# Patient Record
Sex: MALE | Race: WHITE | Employment: FULL TIME | URBAN - METROPOLITAN AREA
[De-identification: names, ages, dates, MRNs, and addresses within clinical notes are randomized per-mention and may not be internally consistent; named-entity substitution may affect disease eponyms.]

---

## 2018-06-13 ENCOUNTER — HOSPITAL ENCOUNTER (OUTPATIENT)
Dept: PHYSICAL THERAPY | Age: 28
Discharge: HOME | End: 2018-06-13
Payer: COMMERCIAL

## 2018-06-13 PROCEDURE — 97140 MANUAL THERAPY 1/> REGIONS: CPT

## 2018-06-13 PROCEDURE — 97161 PT EVAL LOW COMPLEX 20 MIN: CPT

## 2018-06-13 PROCEDURE — 97110 THERAPEUTIC EXERCISES: CPT

## 2018-06-13 NOTE — PROGRESS NOTES
Ambulatory/Rehab Services H2 Model Falls Risk Assessment    Risk Factor Pts. ·   Confusion/Disorientation/Impulsivity  []    4 ·   Symptomatic Depression  []   2 ·   Altered Elimination  []   1 ·   Dizziness/Vertigo  []   1 ·   Gender (Male)  []   1 ·   Any administered antiepileptics (anticonvulsants):  []   2 ·   Any administered benzodiazepines:  []   1 ·   Visual Impairment (specify):  []   1 ·   Portable Oxygen Use  []   1 ·   Orthostatic ? BP  []   1 ·   History of Recent Falls (within 3 mos.)  []   5     Ability to Rise from Chair (choose one) Pts. ·   Ability to rise in a single movement  []   0 ·   Pushes up, successful in one attempt  [x]   1 ·   Multiple attempts, but successful  []   3 ·   Unable to rise without assistance  []   4   Total: (5 or greater = High Risk) 1     Falls Prevention Plan:   []                Physical Limitations to Exercise (specify):   []                Mobility Assistance Device (type):   []                Exercise/Equipment Adaptation (specify):    ©2010 [facility] [address] Patent #[id number].  Federal Law prohibits the replication, distribution or use without written permission from [facility] [employer]

## 2018-06-13 NOTE — THERAPY EVALUATION
Andree Patel  : 1990  Primary: Micky Mortensen [facility]  Secondary:  [address] at [address], [place], [address] [place] [address]  [phone]   [phone]         OUTPATIENT PHYSICAL THERAPY:Initial Assessment 2018    ICD-10: Treatment Diagnosis: laceration without foreign body of other part of head, initial encounter (S01.81XA)  Treatment Diagnosis 2: Open bite of other part of head, initial encounter (S01.85XA)  Treatment Diagnosis 3: headache (R51)  Precautions: None  Allergies:   Review of patient's allergies indicates no known allergies. Fall Risk Score: 1 (? 5 = High Risk)  MD Orders: Evaluate and Treat MEDICAL/REFERRING DIAGNOSIS:  Laceration without foreign body of other part of head, initial encounter [place]  Open bite of other part of head, initial encounter [S01.85XA]   DATE OF ONSET: 17  REFERRING PHYSICIAN: Thor Newton MD  RETURN PHYSICIAN APPOINTMENT: not scheduled     INITIAL ASSESSMENT:  Mr. Maty Preciado is a 32 y.o. male presenting with scalp and head tenderness, pain, numbness, and associated nausea due to scar tissue from a dog bite he incurred 17. 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 The dog reset the original bite so he incurred 2 bites from the dog. He reported significant tearing of the skin of his scalp that required surgery and 60 staples. He currently reports significant tenderness, hypersensitivity, pain, and numbness of the areas involving the bite. He is eager to improve his symptoms and reduce overall discomfort. He is a good candidate for skilled intervention with services to include manual therapy, modalities as needed, therapeutic exercises, postural re-education, and scar desensitization/mobilization.      PROBLEM LIST (Impacting functional limitations):  1. Increased Pain  2. Hypersensitivity, numbness, and nausea INTERVENTIONS PLANNED:  1. Cold  2. Cryotherapy  3. Heat  4. Home Exercise Program (HEP)  5. Manual Therapy  6. Range of Motion (ROM)  7. Therapeutic Exercise/Strengthening  8. scar desensitization/mobilization   TREATMENT PLAN:  Effective Dates: 6/13/2018 TO 7/25/2018. Frequency/Duration: 2 times a week for 4-6 weeks  GOALS: (Goals have been discussed and agreed upon with patient.)  Short-Term Functional Goals: Time Frame: 6/13/2018 to 7/4/2018  1. Patient demonstrates independence with home exercise program without verbal cueing provided by therapist.  2. Improve patient's tolerance to touching his head and performing wash rag desensitization for up to 5 minutes at least 1 time a day. 3. Improve flexibility of bilateral upper trapezius and levator scapulae bilaterally in order to reduce suboccipital and cervical tightness. Discharge Goals: Time Frame: 6/13/2018 to 7/25/2018  1. Improve pain 2/10 at the most with wearing a hat, touching his head, and washing his hair. 2. Improve tolerance to scar mobilization in the clinic and with HEP for up to 10 minutes with minimal to no sensation of nausea and minimal pain. 3. Improve tenderness to palpation and spasm of bilateral suboccipitals and proximal cervical paraspinals in order to reduce head pain. 4. Reduce ear symptoms of fullness/opening and pain with incidence less than 1 time a day. 5. Maintain Neck Disability Index score at 0/50 throughout rehab process. Rehabilitation Potential For Stated Goals: Good  Regarding Megan Carina therapy, I certify that the treatment plan above will be carried out by a therapist or under their direction.   Thank you for this referral,  Lupe Tripathi, PT, DPT, OCS     Referring Physician Signature: Prashant Beckett MD _________________________________ Date: ________            The information in this section was collected on 6/13/2018 (except where otherwise noted). HISTORY:   History of Present Injury/Illness (Reason for Referral):  Mr. Tanika Betts is a 32 y.o. male presenting with scalp and head tenderness, pain, numbness, and associated nausea due to scar tissue from a dog bite he incurred 6/27/17. He reported he was training a [address] as a part of his job as a [employer]  and the dog accidentally bit him on the top of the head. The dog reset the original bite so he incurred 2 bites from the dog. He reported significant tearing of the skin of his scalp that required surgery and 60 staples. He currently reports significant tenderness, hypersensitivity, pain, and numbness of the areas involving the bite. He is eager to improve his symptoms and reduce overall discomfort. Past Medical History/Comorbidities:   Mr. Tanika Betts  has no past medical history on file. Mr. Tanika Betts  has no past surgical history on file. Social History/Living Environment:    Patient lives at home with wife and reports no complaints with household chores/ADLs. Prior Level of Function/Work/Activity:  Independent without dysfunction. 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 He is very active and exercise at the gym lifting weights 3 times a week. Dominant Side:         RIGHT  Current Medications:       Current Outpatient Prescriptions:    fish oil and daily multivitamin   Date Last Reviewed:  6/13/2018   Number of Personal Factors/Comorbidities that affect the Plan of Care: 0: LOW COMPLEXITY   EXAMINATION:     Patient denies any increase of symptoms with coughing, sneezing or valsalva maneuver. Patient denies any changes in vision, dizziness, vertigo, drop attacks, black outs, tinnitus, dysphagia, dysarthria, LE symptoms or bowel/bladder dysfunction.     Observation/Orthostatic Postural Assessment: Patient sits with moderate forward head, forward shoulders, and thoracic kyphosis that is reversible with cuing. Superior portion of scalp reveals irregular scar across top of head that is completely approximated with scar tissue. No significant deformity of the skull/scalp is noted. Palpation: Gross tenderness to palpation and hypersensitivity of the scalp and scar tissue superiorly. Significant restrictions are noted in all directions to entire scar but mostly to the right portions of the scalp with palpable divot noted. Flexibility mildly limited of upper trapezius and levator scapulae bilaterally. Suboccipitals limited bilaterally. Vertebral-Basilar Screen: Hautant's test is negative. Cranial extension test is negative. ROM:   Cervical extension (degrees): 70°   Cervical flexion: 60°   Cervical left side bend: 50°   Cervical right side bend: 40°   Cervical left rotation: 80°   Cervical right rotation: 70°     Strength: Grossly 5/5 for upper quarter. Joint Mobility:  Mild to moderate limitations noted of suboccipitals with posterior glide of occiput on C1. Mild limitations with bilateral C1 on C2 rotations with cervical spine locked into full flexion. Special Tests: Ligament stress tests performed through upper cervical spine for transverse ligament including Sharp-Pillo test is negative, and [place]-Axis shear test is negative. [place]-Axis side flexion test for integrity of alar ligament is negative. Spurling test is mildly positive on the right. Cervical distraction is for stretch and tension release of neck. Neurovascular testing for thoracic outlet syndrome is negative. Neurological Screen:  Myotomes: Key muscle strength testing for bilateral UE is WNL. Dermatomes: Sensation testing through bilateral upper quadrants for light touch is WNL. Functional Mobility:  Patient is independent and safe with all activities. Reports biggest limitation is sensitivity to the touch of the top of the head. Body Structures Involved:  1. Joints  2. Muscles  3. Ligaments Body Functions Affected:  1. Sensory/Pain  2. Neuromusculoskeletal Activities and Participation Affected:  1. Community, Social and [place]   Number of elements (examined above) that affect the Plan of Care: 3: MODERATE COMPLEXITY   CLINICAL PRESENTATION:   Presentation: Stable and uncomplicated: LOW COMPLEXITY   CLINICAL DECISION MAKING:   Outcome Measure: Tool Used: Neck Disability Index (NDI)  Score:  Initial: 0/50  Most Recent: X/50 (Date: -- )   Interpretation of Score: The Neck Disability Index is a revised form of the Oswestry Low Back Pain Index and is designed to measure the activities of daily living in person's with neck pain. Each section is scored on a 0-5 scale, 5 representing the greatest disability. The scores of each section are added together for a total score of 50. Medical Necessity:   · Patient is expected to demonstrate progress in strength, range of motion, balance and coordination to improve tolerance to touching his head, performing self scar mobilization, washing hair, and wearing hats. · Skilled intervention continues to be required due to hypersensitivity, head pain, numbness, and nausea associated with pain. Reason for Services/Other Comments:  · Patient continues to require skilled intervention due to increasing complexity of exercises. Use of outcome tool(s) and clinical judgement create a POC that gives a: Clear prediction of patient's progress: LOW COMPLEXITY            TREATMENT:   (In addition to Assessment/Re-Assessment sessions the following treatments were rendered)    Pre-treatment Symptoms/Complaints:  Patient reported significant hypersensitivity to his scalp and associated nausea at times. Pain: Initial:   Pain Intensity 1: 4  Pain Location 1: Head  Pain Orientation 1: Proximal, Upper  Post Session:  4/10 patient was advised to take something over the counter and ice at home.      Therapeutic Exercise: (15 Minutes):  Exercises per grid below to improve mobility. Required minimal visual, verbal and manual cues to promote proper body alignment and promote proper body breathing techniques. Progressed resistance, range, repetitions and complexity of movement as indicated. (used abbreviations: BET-back education training) Date:  6/13/2018 Date:   Date:     Activity/Exercise Parameters Parameters Parameters   Upper trapezius stretch 3 x 30 sec with hands     Levator scapulae stretch 3 x 30 sec with hands                                     Instruction for self mobilization of scar and use of dry wash rag for desensitization - 5 min    Manual Therapy (    Soft Tissue Mobilization Duration  Duration: 15 Minutes): improve joint and soft tissue mobility  · Supine stretching of upper trapezius and levator scapulae on the right  · Supine traction of cervical spine and suboccipitals  · Supine deep tissue mobilization of suboccipitals on the right  · Supine deep tissue mobilization of the scalp scar tissue without cream and primary gliding of the scars in all directions  (Used abbreviations: MET - muscle energy technique; PNF - proprioceptive neuromuscular facilitation; NMR - neuromuscular re-education; a/p - anterior to posterior; p/a - posterior to anterior; NT - not tested)    Therapeutic Modalities: for edema and pain                              Cervical Spine Cold  Type: Cold/ice pack  Duration : 10 minutes  Patient Position: Supine (to top of head)                                                                HEP: As above; handouts given to patient for all exercises. Treatment/Session Assessment:    · Response to Treatment:  Patient tolerated session well but experienced significant nausea after soft tissue mobilization of the scalp. He was laid back down with ice to the top of the head and instructed to perform deep breathing for 10 minutes.   Afterwards, he reported minimal to no nausea at the end of session. Progress as tolerated with emphasis on scar tissue mobilization, densensitization, and cervical spine exercises. · Compliance with Program/Exercises: compliant most of the time. · Recommendations/Intent for next treatment session: \"Next visit will focus on advancements to more challenging activities\".     Total Treatment Duration: 70 minutes; 30 evaluation, 15 exercises, 15 manual therapy, 10 cold pack  PT Patient Time In/Time Out  Time In: 1520  Time Out: [address] [place]

## 2018-06-18 ENCOUNTER — HOSPITAL ENCOUNTER (OUTPATIENT)
Dept: PHYSICAL THERAPY | Age: 28
Discharge: HOME | End: 2018-06-18
Payer: COMMERCIAL

## 2018-06-18 PROCEDURE — 97140 MANUAL THERAPY 1/> REGIONS: CPT

## 2018-06-18 NOTE — PROGRESS NOTES
Dexter Cornejo  : 1990  Primary: [facility]  Secondary:  [address] at [address], [place], [address] [place] [address]  [phone]   [phone]         OUTPATIENT PHYSICAL THERAPY:Daily Note 2018    ICD-10: Treatment Diagnosis: laceration without foreign body of other part of head, initial encounter (S01.81XA)  Treatment Diagnosis 2: Open bite of other part of head, initial encounter (S01.85XA)  Treatment Diagnosis 3: headache (R51)  Precautions: None  Allergies:   Review of patient's allergies indicates no known allergies. Fall Risk Score: 1 (? 5 = High Risk)  MD Orders: Evaluate and Treat MEDICAL/REFERRING DIAGNOSIS:  Laceration without foreign body of other part of head, initial encounter Napoleon Rede  Open bite of other part of head, initial encounter [S01.85XA]   DATE OF ONSET: 17  REFERRING PHYSICIAN: Verito Rubio [facility] Street: not scheduled     INITIAL ASSESSMENT:  Mr. El Walters is a 32 y.o. male presenting with scalp and head tenderness, pain, numbness, and associated nausea due to scar tissue from a dog bite he incurred 17. 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 The dog reset the original bite so he incurred 2 bites from the dog. He reported significant tearing of the skin of his scalp that required surgery and 60 staples. He currently reports significant tenderness, hypersensitivity, pain, and numbness of the areas involving the bite. He is eager to improve his symptoms and reduce overall discomfort. He is a good candidate for skilled intervention with services to include manual therapy, modalities as needed, therapeutic exercises, postural re-education, and scar desensitization/mobilization. PROBLEM LIST (Impacting functional limitations):  1. Increased Pain  2.  Hypersensitivity, numbness, and nausea INTERVENTIONS PLANNED:  1. Cold  2. Cryotherapy  3. Heat  4. Home Exercise Program (HEP)  5. Manual Therapy  6. Range of Motion (ROM)  7. Therapeutic Exercise/Strengthening  8. scar desensitization/mobilization   TREATMENT PLAN:  Effective Dates: 6/13/2018 TO 7/25/2018. Frequency/Duration: 2 times a week for 4-6 weeks  GOALS: (Goals have been discussed and agreed upon with patient.)  Short-Term Functional Goals: Time Frame: 6/13/2018 to 7/4/2018  1. Patient demonstrates independence with home exercise program without verbal cueing provided by therapist.  2. Improve patient's tolerance to touching his head and performing wash rag desensitization for up to 5 minutes at least 1 time a day. 3. Improve flexibility of bilateral upper trapezius and levator scapulae bilaterally in order to reduce suboccipital and cervical tightness. Discharge Goals: Time Frame: 6/13/2018 to 7/25/2018  1. Improve pain 2/10 at the most with wearing a hat, touching his head, and washing his hair. 2. Improve tolerance to scar mobilization in the clinic and with HEP for up to 10 minutes with minimal to no sensation of nausea and minimal pain. 3. Improve tenderness to palpation and spasm of bilateral suboccipitals and proximal cervical paraspinals in order to reduce head pain. 4. Reduce ear symptoms of fullness/opening and pain with incidence less than 1 time a day. 5. Maintain Neck Disability Index score at 0/50 throughout rehab process. Rehabilitation Potential For Stated Goals: Good  Regarding Reyna Griffiths therapy, I certify that the treatment plan above will be carried out by a therapist or under their direction. Thank you for this referral,  Pedro Arreola, PT, DPT, OCS     Referring Physician Signature: Severino Desai*              Date                    The information in this section was collected on 6/13/2018 (except where otherwise noted).   HISTORY:   History of Present Injury/Illness (Reason for Referral):  Mr. Tobias Davey is a 32 y.o. male presenting with scalp and head tenderness, pain, numbness, and associated nausea due to scar tissue from a dog bite he incurred 6/27/17. 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 The dog reset the original bite so he incurred 2 bites from the dog. He reported significant tearing of the skin of his scalp that required surgery and 60 staples. He currently reports significant tenderness, hypersensitivity, pain, and numbness of the areas involving the bite. He is eager to improve his symptoms and reduce overall discomfort. Past Medical History/Comorbidities:   Mr. Tobias Davey  has no past medical history on file. Mr. Tobias Davey  has no past surgical history on file. Social History/Living Environment:    Patient lives at home with wife and reports no complaints with household chores/ADLs. Prior Level of Function/Work/Activity:  Independent without dysfunction. 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 He is very active and exercise at the gym lifting weights 3 times a week. Dominant Side:         RIGHT  Current Medications:       Current Outpatient Prescriptions:    fish oil and daily multivitamin   Date Last Reviewed:  6/18/2018   Number of Personal Factors/Comorbidities that affect the Plan of Care: 0: LOW COMPLEXITY   EXAMINATION:     Patient denies any increase of symptoms with coughing, sneezing or valsalva maneuver. Patient denies any changes in vision, dizziness, vertigo, drop attacks, black outs, tinnitus, dysphagia, dysarthria, LE symptoms or bowel/bladder dysfunction. Observation/Orthostatic Postural Assessment: Patient sits with moderate forward head, forward shoulders, and thoracic kyphosis that is reversible with cuing. Superior portion of scalp reveals irregular scar across top of head that is completely approximated with scar tissue. No significant deformity of the skull/scalp is noted. Palpation: Gross tenderness to palpation and hypersensitivity of the scalp and scar tissue superiorly. Significant restrictions are noted in all directions to entire scar but mostly to the right portions of the scalp with palpable divot noted. Flexibility mildly limited of upper trapezius and levator scapulae bilaterally. Suboccipitals limited bilaterally. Vertebral-Basilar Screen: Hautant's test is negative. Cranial extension test is negative. ROM:   Cervical extension (degrees): 70°   Cervical flexion: 60°   Cervical left side bend: 50°   Cervical right side bend: 40°   Cervical left rotation: 80°   Cervical right rotation: 70°     Strength: Grossly 5/5 for upper quarter. Joint Mobility:  Mild to moderate limitations noted of suboccipitals with posterior glide of occiput on C1. Mild limitations with bilateral C1 on C2 rotations with cervical spine locked into full flexion. Special Tests: Ligament stress tests performed through upper cervical spine for transverse ligament including Sharp-Pillo test is negative, and Cleveland-Axis shear test is negative. Cleveland-Axis side flexion test for integrity of alar ligament is negative. Spurling test is mildly positive on the right. Cervical distraction is for stretch and tension release of neck. Neurovascular testing for thoracic outlet syndrome is negative. Neurological Screen:  Myotomes: Key muscle strength testing for bilateral UE is WNL. Dermatomes: Sensation testing through bilateral upper quadrants for light touch is WNL. Functional Mobility:  Patient is independent and safe with all activities. Reports biggest limitation is sensitivity to the touch of the top of the head. Body Structures Involved:  1. Joints  2. Muscles  3. Ligaments Body Functions Affected:  1. Sensory/Pain  2. Neuromusculoskeletal Activities and Participation Affected:  1.  Community, Social and [place]   Number of elements (examined above) that affect the Plan of Care: 3: MODERATE COMPLEXITY   CLINICAL PRESENTATION:   Presentation: Stable and uncomplicated: LOW COMPLEXITY   CLINICAL DECISION MAKING:   Outcome Measure: Tool Used: Neck Disability Index (NDI)  Score:  Initial: 0/50  Most Recent: X/50 (Date: -- )   Interpretation of Score: The Neck Disability Index is a revised form of the Oswestry Low Back Pain Index and is designed to measure the activities of daily living in person's with neck pain. Each section is scored on a 0-5 scale, 5 representing the greatest disability. The scores of each section are added together for a total score of 50. Medical Necessity:   · Patient is expected to demonstrate progress in strength, range of motion, balance and coordination to improve tolerance to touching his head, performing self scar mobilization, washing hair, and wearing hats. · Skilled intervention continues to be required due to hypersensitivity, head pain, numbness, and nausea associated with pain. Reason for Services/Other Comments:  · Patient continues to require skilled intervention due to increasing complexity of exercises. Use of outcome tool(s) and clinical judgement create a POC that gives a: Clear prediction of patient's progress: LOW COMPLEXITY            TREATMENT:   (In addition to Assessment/Re-Assessment sessions the following treatments were rendered)    Pre-treatment Symptoms/Complaints:  Patient reports minimal pain in his head today, but some numbness. States he gets some R ear canal sensations and nausea at times. Pain: Initial:   Pain Intensity 1: 2  Pain Location 1: Head  Pain Orientation 1: Other (comment) (top of head; central)  Post Session:  5/10 \"heaviness\" in head; 3/10 nausea     Therapeutic Exercise: ( ):  Exercises per grid below to improve mobility. Required minimal visual, verbal and manual cues to promote proper body alignment and promote proper body breathing techniques. Progressed resistance, range, repetitions and complexity of movement as indicated. (used abbreviations: BET-back education training) Date:  6/13/2018 Date:   Date:     Activity/Exercise Parameters Parameters Parameters   Upper trapezius stretch 3 x 30 sec with hands     Levator scapulae stretch 3 x 30 sec with hands                                       Manual Therapy (    Soft Tissue Mobilization Duration  Duration: 55 Minutes): improve joint and soft tissue mobility  · Supine stretching of upper trapezius and levator scapulae on the right  · Supine traction of cervical spine and suboccipitals  · Supine deep tissue mobilization of suboccipitals on the right  · Supine deep tissue mobilization of the scalp scar tissue without cream and primary gliding of the scars in all directions  (Used abbreviations: MET - muscle energy technique; PNF - proprioceptive neuromuscular facilitation; NMR - neuromuscular re-education; a/p - anterior to posterior; p/a - posterior to anterior; NT - not tested)    Therapeutic Modalities: for edema and pain : patient denied ice today                                                                                              HEP: As above; handouts given to patient for all exercises. Treatment/Session Assessment:    · Response to Treatment:  Focused on manual therapy today due to patient response. Improved tissue mobility noted by end of sessions. Some nausea reported with scalp mobilization. Transition between neck and head treatments seemed to help with nausea and overall malaise with manual therapy. Denied ice today stating he luis OK at end of treatment. Patient willing to shave his head for treatments if needed. Advised him to continue with cervical stretches and desensitization and will progress cervical exercises as tolerated next session. · Compliance with Program/Exercises: compliant most of the time. · Recommendations/Intent for next treatment session:  \"Next visit will focus on advancements to more challenging activities\".     Total Treatment Duration: 55 minutes  PT Patient Time In/Time Out  Time In: 1430  Time Out: [address] Mcmahon, PT

## 2018-06-19 ENCOUNTER — APPOINTMENT (OUTPATIENT)
Dept: PHYSICAL THERAPY | Age: 28
End: 2018-06-19
Payer: COMMERCIAL

## 2018-06-21 ENCOUNTER — HOSPITAL ENCOUNTER (OUTPATIENT)
Dept: PHYSICAL THERAPY | Age: 28
End: 2018-06-21
Payer: COMMERCIAL

## 2018-06-22 ENCOUNTER — HOSPITAL ENCOUNTER (OUTPATIENT)
Dept: PHYSICAL THERAPY | Age: 28
Discharge: HOME | End: 2018-06-22
Payer: COMMERCIAL

## 2018-06-22 PROCEDURE — 97140 MANUAL THERAPY 1/> REGIONS: CPT

## 2018-06-22 NOTE — PROGRESS NOTES
Andree Wahl  : 1990  Primary: Tawny [facility]  Secondary:  [address] at [address], [place], [address] [place] [address]  [phone]   [phone]         OUTPATIENT PHYSICAL THERAPY:Daily Note 2018    ICD-10: Treatment Diagnosis: laceration without foreign body of other part of head, initial encounter (S01.81XA)  Treatment Diagnosis 2: Open bite of other part of head, initial encounter (S01.85XA)  Treatment Diagnosis 3: headache (R51)  Precautions: None  Allergies:   Review of patient's allergies indicates no known allergies. Fall Risk Score: 1 (? 5 = High Risk)  MD Orders: Evaluate and Treat MEDICAL/REFERRING DIAGNOSIS:  Laceration without foreign body of other part of head, initial encounter [facility]  Open bite of other part of head, initial encounter [S01.85XA]   DATE OF ONSET: 17  REFERRING PHYSICIAN: Verito Rubio [facility] Street: not scheduled     INITIAL ASSESSMENT:  Mr. Cris Jones is a 32 y.o. male presenting with scalp and head tenderness, pain, numbness, and associated nausea due to scar tissue from a dog bite he incurred 17. 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 The dog reset the original bite so he incurred 2 bites from the dog. He reported significant tearing of the skin of his scalp that required surgery and 60 staples. He currently reports significant tenderness, hypersensitivity, pain, and numbness of the areas involving the bite. He is eager to improve his symptoms and reduce overall discomfort. He is a good candidate for skilled intervention with services to include manual therapy, modalities as needed, therapeutic exercises, postural re-education, and scar desensitization/mobilization. PROBLEM LIST (Impacting functional limitations):  1. Increased Pain  2.  Hypersensitivity, numbness, and nausea INTERVENTIONS PLANNED:  1. Cold  2. Cryotherapy  3. Heat  4. Home Exercise Program (HEP)  5. Manual Therapy  6. Range of Motion (ROM)  7. Therapeutic Exercise/Strengthening  8. scar desensitization/mobilization   TREATMENT PLAN:  Effective Dates: 6/13/2018 TO 7/25/2018. Frequency/Duration: 2 times a week for 4-6 weeks  GOALS: (Goals have been discussed and agreed upon with patient.)  Short-Term Functional Goals: Time Frame: 6/13/2018 to 7/4/2018  1. Patient demonstrates independence with home exercise program without verbal cueing provided by therapist.  2. Improve patient's tolerance to touching his head and performing wash rag desensitization for up to 5 minutes at least 1 time a day. 3. Improve flexibility of bilateral upper trapezius and levator scapulae bilaterally in order to reduce suboccipital and cervical tightness. Discharge Goals: Time Frame: 6/13/2018 to 7/25/2018  1. Improve pain 2/10 at the most with wearing a hat, touching his head, and washing his hair. 2. Improve tolerance to scar mobilization in the clinic and with HEP for up to 10 minutes with minimal to no sensation of nausea and minimal pain. 3. Improve tenderness to palpation and spasm of bilateral suboccipitals and proximal cervical paraspinals in order to reduce head pain. 4. Reduce ear symptoms of fullness/opening and pain with incidence less than 1 time a day. 5. Maintain Neck Disability Index score at 0/50 throughout rehab process. Rehabilitation Potential For Stated Goals: Good  Regarding Fer Desai therapy, I certify that the treatment plan above will be carried out by a therapist or under their direction. Thank you for this referral,  Jannette Sanchez, PT, DPT, OCS     Referring Physician Signature: Evan Toney*              Date                    The information in this section was collected on 6/13/2018 (except where otherwise noted).   HISTORY:   History of Present Injury/Illness (Reason for Referral):  Mr. Brittany Jean is a 32 y.o. male presenting with scalp and head tenderness, pain, numbness, and associated nausea due to scar tissue from a dog bite he incurred 6/27/17. 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 The dog reset the original bite so he incurred 2 bites from the dog. He reported significant tearing of the skin of his scalp that required surgery and 60 staples. He currently reports significant tenderness, hypersensitivity, pain, and numbness of the areas involving the bite. He is eager to improve his symptoms and reduce overall discomfort. Past Medical History/Comorbidities:   Mr. Brittany Jean  has no past medical history on file. Mr. Brittany Jean  has no past surgical history on file. Social History/Living Environment:    Patient lives at home with wife and reports no complaints with household chores/ADLs. Prior Level of Function/Work/Activity:  Independent without dysfunction. 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 He is very active and exercise at the gym lifting weights 3 times a week. Dominant Side:         RIGHT  Current Medications:       Current Outpatient Prescriptions:    fish oil and daily multivitamin   Date Last Reviewed:  6/22/2018   Number of Personal Factors/Comorbidities that affect the Plan of Care: 0: LOW COMPLEXITY   EXAMINATION:     Patient denies any increase of symptoms with coughing, sneezing or valsalva maneuver. Patient denies any changes in vision, dizziness, vertigo, drop attacks, black outs, tinnitus, dysphagia, dysarthria, LE symptoms or bowel/bladder dysfunction. Observation/Orthostatic Postural Assessment: Patient sits with moderate forward head, forward shoulders, and thoracic kyphosis that is reversible with cuing. Superior portion of scalp reveals irregular scar across top of head that is completely approximated with scar tissue. No significant deformity of the skull/scalp is noted. Palpation: Gross tenderness to palpation and hypersensitivity of the scalp and scar tissue superiorly. Significant restrictions are noted in all directions to entire scar but mostly to the right portions of the scalp with palpable divot noted. Flexibility mildly limited of upper trapezius and levator scapulae bilaterally. Suboccipitals limited bilaterally. Vertebral-Basilar Screen: Hautant's test is negative. Cranial extension test is negative. ROM:   Cervical extension (degrees): 70°   Cervical flexion: 60°   Cervical left side bend: 50°   Cervical right side bend: 40°   Cervical left rotation: 80°   Cervical right rotation: 70°     Strength: Grossly 5/5 for upper quarter. Joint Mobility:  Mild to moderate limitations noted of suboccipitals with posterior glide of occiput on C1. Mild limitations with bilateral C1 on C2 rotations with cervical spine locked into full flexion. Special Tests: Ligament stress tests performed through upper cervical spine for transverse ligament including Sharp-Pillo test is negative, and Erick-Axis shear test is negative. Erick-Axis side flexion test for integrity of alar ligament is negative. Spurling test is mildly positive on the right. Cervical distraction is for stretch and tension release of neck. Neurovascular testing for thoracic outlet syndrome is negative. Neurological Screen:  Myotomes: Key muscle strength testing for bilateral UE is WNL. Dermatomes: Sensation testing through bilateral upper quadrants for light touch is WNL. Functional Mobility:  Patient is independent and safe with all activities. Reports biggest limitation is sensitivity to the touch of the top of the head. Body Structures Involved:  1. Joints  2. Muscles  3. Ligaments Body Functions Affected:  1. Sensory/Pain  2. Neuromusculoskeletal Activities and Participation Affected:  1.  Community, Social and [place]   Number of elements (examined above) that affect the Plan of Care: 3: MODERATE COMPLEXITY   CLINICAL PRESENTATION:   Presentation: Stable and uncomplicated: LOW COMPLEXITY   CLINICAL DECISION MAKING:   Outcome Measure: Tool Used: Neck Disability Index (NDI)  Score:  Initial: 0/50  Most Recent: X/50 (Date: -- )   Interpretation of Score: The Neck Disability Index is a revised form of the Oswestry Low Back Pain Index and is designed to measure the activities of daily living in person's with neck pain. Each section is scored on a 0-5 scale, 5 representing the greatest disability. The scores of each section are added together for a total score of 50. Medical Necessity:   · Patient is expected to demonstrate progress in strength, range of motion, balance and coordination to improve tolerance to touching his head, performing self scar mobilization, washing hair, and wearing hats. · Skilled intervention continues to be required due to hypersensitivity, head pain, numbness, and nausea associated with pain. Reason for Services/Other Comments:  · Patient continues to require skilled intervention due to increasing complexity of exercises. Use of outcome tool(s) and clinical judgement create a POC that gives a: Clear prediction of patient's progress: LOW COMPLEXITY            TREATMENT:   (In addition to Assessment/Re-Assessment sessions the following treatments were rendered)    Pre-treatment Symptoms/Complaints:  Patient reported no much change in symptoms but he has tolerated towel desensitization exercise at home. Pain: Initial:   Pain Intensity 1: 0  Pain Location 1: Head  Post Session:  0/10     Therapeutic Exercise: ( ):  Exercises per grid below to improve mobility. Required minimal visual, verbal and manual cues to promote proper body alignment and promote proper body breathing techniques. Progressed resistance, range, repetitions and complexity of movement as indicated.   (used abbreviations: BET-back education training) Date:  6/13/2018 Date:   Date:     Activity/Exercise Parameters Parameters Parameters   Upper trapezius stretch 3 x 30 sec with hands     Levator scapulae stretch 3 x 30 sec with hands                                       Manual Therapy (    Soft Tissue Mobilization Duration  Duration: 55 Minutes): improve joint and soft tissue mobility  · Supine stretching of upper trapezius and levator scapulae on the right  · Supine traction of cervical spine and suboccipitals  · Supine deep tissue mobilization of suboccipitals on the right  · Supine deep tissue mobilization of the scalp scar tissue with cream and primary gliding of the scars in all directions  (Used abbreviations: MET - muscle energy technique; PNF - proprioceptive neuromuscular facilitation; NMR - neuromuscular re-education; a/p - anterior to posterior; p/a - posterior to anterior; NT - not tested)    Therapeutic Modalities: for edema and pain : patient denied ice today                                                                                              HEP: As above; handouts given to patient for all exercises. Treatment/Session Assessment:    · Response to Treatment:  Focused on manual therapy today with combination of cervical and scalp manual therapy. No nausea with manual therapy. · Compliance with Program/Exercises: compliant most of the time. · Recommendations/Intent for next treatment session: \"Next visit will focus on advancements to more challenging activities\".     Total Treatment Duration: 55 minutes  PT Patient Time In/Time Out  Time In: 0580  Time Out: [address]

## 2018-06-25 ENCOUNTER — HOSPITAL ENCOUNTER (OUTPATIENT)
Dept: PHYSICAL THERAPY | Age: 28
Discharge: HOME | End: 2018-06-25
Payer: COMMERCIAL

## 2018-06-25 PROCEDURE — 97140 MANUAL THERAPY 1/> REGIONS: CPT

## 2018-06-25 NOTE — PROGRESS NOTES
Saint Offer  : 1990  Primary: Ank[facility]  Secondary:  [address] at [address], [address]  [phone]   [phone]         OUTPATIENT PHYSICAL THERAPY:Daily Note 2018    ICD-10: Treatment Diagnosis: laceration without foreign body of other part of head, initial encounter (S01.81XA)  Treatment Diagnosis 2: Open bite of other part of head, initial encounter (S01.85XA)  Treatment Diagnosis 3: headache (R51)  Precautions: None  Allergies:   Review of patient's allergies indicates no known allergies. Fall Risk Score: 1 (? 5 = High Risk)  MD Orders: Evaluate and Treat MEDICAL/REFERRING DIAGNOSIS:  Laceration without foreign body of other part of head, initial encounter Ramon Nance  Open bite of other part of head, initial encounter [S01.85XA]   DATE OF ONSET: 17  REFERRING PHYSICIAN: Verito Rubio [facility] Street: not scheduled     INITIAL ASSESSMENT:  Mr. Grace Dewey is a 32 y.o. male presenting with scalp and head tenderness, pain, numbness, and associated nausea due to scar tissue from a dog bite he incurred 17. 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 The dog reset the original bite so he incurred 2 bites from the dog. He reported significant tearing of the skin of his scalp that required surgery and 60 staples. He currently reports significant tenderness, hypersensitivity, pain, and numbness of the areas involving the bite. He is eager to improve his symptoms and reduce overall discomfort. He is a good candidate for skilled intervention with services to include manual therapy, modalities as needed, therapeutic exercises, postural re-education, and scar desensitization/mobilization. PROBLEM LIST (Impacting functional limitations):  1. Increased Pain  2.  Hypersensitivity, numbness, and nausea INTERVENTIONS PLANNED:  1. Cold  2. Cryotherapy  3. Heat  4. Home Exercise Program (HEP)  5. Manual Therapy  6. Range of Motion (ROM)  7. Therapeutic Exercise/Strengthening  8. scar desensitization/mobilization   TREATMENT PLAN:  Effective Dates: 6/13/2018 TO 7/25/2018. Frequency/Duration: 2 times a week for 4-6 weeks  GOALS: (Goals have been discussed and agreed upon with patient.)  Short-Term Functional Goals: Time Frame: 6/13/2018 to 7/4/2018  1. Patient demonstrates independence with home exercise program without verbal cueing provided by therapist.  2. Improve patient's tolerance to touching his head and performing wash rag desensitization for up to 5 minutes at least 1 time a day. 3. Improve flexibility of bilateral upper trapezius and levator scapulae bilaterally in order to reduce suboccipital and cervical tightness. Discharge Goals: Time Frame: 6/13/2018 to 7/25/2018  1. Improve pain 2/10 at the most with wearing a hat, touching his head, and washing his hair. 2. Improve tolerance to scar mobilization in the clinic and with HEP for up to 10 minutes with minimal to no sensation of nausea and minimal pain. 3. Improve tenderness to palpation and spasm of bilateral suboccipitals and proximal cervical paraspinals in order to reduce head pain. 4. Reduce ear symptoms of fullness/opening and pain with incidence less than 1 time a day. 5. Maintain Neck Disability Index score at 0/50 throughout rehab process. Rehabilitation Potential For Stated Goals: Good  Regarding [place] therapy, I certify that the treatment plan above will be carried out by a therapist or under their direction. Thank you for this referral,  Erica De Souza, PT, DPT, OCS     Referring Physician Signature: Orlando Shelton*              Date                    The information in this section was collected on 6/13/2018 (except where otherwise noted).   HISTORY:   History of Present Injury/Illness (Reason for Referral):  Mr. Godfrey Proctor is a 32 y.o. male presenting with scalp and head tenderness, pain, numbness, and associated nausea due to scar tissue from a dog bite he incurred 6/27/17. 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 The dog reset the original bite so he incurred 2 bites from the dog. He reported significant tearing of the skin of his scalp that required surgery and 60 staples. He currently reports significant tenderness, hypersensitivity, pain, and numbness of the areas involving the bite. He is eager to improve his symptoms and reduce overall discomfort. Past Medical History/Comorbidities:   Mr. Godfrey Proctor  has no past medical history on file. Mr. Godfrey Proctor  has no past surgical history on file. Social History/Living Environment:    Patient lives at home with wife and reports no complaints with household chores/ADLs. Prior Level of Function/Work/Activity:  Independent without dysfunction. 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 He is very active and exercise at the gym lifting weights 3 times a week. Dominant Side:         RIGHT  Current Medications:       Current Outpatient Prescriptions:    fish oil and daily multivitamin   Date Last Reviewed:  6/25/2018   Number of Personal Factors/Comorbidities that affect the Plan of Care: 0: LOW COMPLEXITY   EXAMINATION:     Patient denies any increase of symptoms with coughing, sneezing or valsalva maneuver. Patient denies any changes in vision, dizziness, vertigo, drop attacks, black outs, tinnitus, dysphagia, dysarthria, LE symptoms or bowel/bladder dysfunction. Observation/Orthostatic Postural Assessment: Patient sits with moderate forward head, forward shoulders, and thoracic kyphosis that is reversible with cuing. Superior portion of scalp reveals irregular scar across top of head that is completely approximated with scar tissue. No significant deformity of the skull/scalp is noted. Palpation: Gross tenderness to palpation and hypersensitivity of the scalp and scar tissue superiorly. Significant restrictions are noted in all directions to entire scar but mostly to the right portions of the scalp with palpable divot noted. Flexibility mildly limited of upper trapezius and levator scapulae bilaterally. Suboccipitals limited bilaterally. Vertebral-Basilar Screen: Hautant's test is negative. Cranial extension test is negative. ROM:   Cervical extension (degrees): 70°   Cervical flexion: 60°   Cervical left side bend: 50°   Cervical right side bend: 40°   Cervical left rotation: 80°   Cervical right rotation: 70°     Strength: Grossly 5/5 for upper quarter. Joint Mobility:  Mild to moderate limitations noted of suboccipitals with posterior glide of occiput on C1. Mild limitations with bilateral C1 on C2 rotations with cervical spine locked into full flexion. Special Tests: Ligament stress tests performed through upper cervical spine for transverse ligament including Sharp-Pillo test is negative, and Jeremiah-Axis shear test is negative. Jeremiah-Axis side flexion test for integrity of alar ligament is negative. Spurling test is mildly positive on the right. Cervical distraction is for stretch and tension release of neck. Neurovascular testing for thoracic outlet syndrome is negative. Neurological Screen:  Myotomes: Key muscle strength testing for bilateral UE is WNL. Dermatomes: Sensation testing through bilateral upper quadrants for light touch is WNL. Functional Mobility:  Patient is independent and safe with all activities. Reports biggest limitation is sensitivity to the touch of the top of the head. Body Structures Involved:  1. Joints  2. Muscles  3. Ligaments Body Functions Affected:  1. Sensory/Pain  2. Neuromusculoskeletal Activities and Participation Affected:  1.  Community, Social and [employer]   Number of elements (examined above) that affect the Plan of Care: 3: MODERATE COMPLEXITY   CLINICAL PRESENTATION:   Presentation: Stable and uncomplicated: LOW COMPLEXITY   CLINICAL DECISION MAKING:   Outcome Measure: Tool Used: Neck Disability Index (NDI)  Score:  Initial: 0/50  Most Recent: X/50 (Date: -- )   Interpretation of Score: The Neck Disability Index is a revised form of the Oswestry Low Back Pain Index and is designed to measure the activities of daily living in person's with neck pain. Each section is scored on a 0-5 scale, 5 representing the greatest disability. The scores of each section are added together for a total score of 50. Medical Necessity:   · Patient is expected to demonstrate progress in strength, range of motion, balance and coordination to improve tolerance to touching his head, performing self scar mobilization, washing hair, and wearing hats. · Skilled intervention continues to be required due to hypersensitivity, head pain, numbness, and nausea associated with pain. Reason for Services/Other Comments:  · Patient continues to require skilled intervention due to increasing complexity of exercises. Use of outcome tool(s) and clinical judgement create a POC that gives a: Clear prediction of patient's progress: LOW COMPLEXITY            TREATMENT:   (In addition to Assessment/Re-Assessment sessions the following treatments were rendered)    Pre-treatment Symptoms/Complaints:  Patient reported less pain overall and no complaints today. Pain: Initial:   Pain Intensity 1: 0  Pain Location 1: Head  Post Session:  0/10 with minor lightheadedness at the end of session     Therapeutic Exercise: ( ):  Exercises per grid below to improve mobility. Required minimal visual, verbal and manual cues to promote proper body alignment and promote proper body breathing techniques. Progressed resistance, range, repetitions and complexity of movement as indicated.   (used abbreviations: BET-back education training) Date:  6/13/2018 Date:   Date:     Activity/Exercise Parameters Parameters Parameters   Upper trapezius stretch 3 x 30 sec with hands     Levator scapulae stretch 3 x 30 sec with hands                                       Manual Therapy (    Soft Tissue Mobilization Duration  Duration: 55 Minutes): improve joint and soft tissue mobility  · Supine stretching of upper trapezius and levator scapulae on the right  · Supine traction of cervical spine and suboccipitals  · Supine deep tissue mobilization of suboccipitals on the right  · Supine deep tissue mobilization of the scalp scar tissue with cream and with and without suction cup and primary gliding of the scars in all directions  (Used abbreviations: MET - muscle energy technique; PNF - proprioceptive neuromuscular facilitation; NMR - neuromuscular re-education; a/p - anterior to posterior; p/a - posterior to anterior; NT - not tested)    Therapeutic Modalities: for edema and pain : patient denied ice today                             Cervical Spine Cold  Type: Cold/ice pack  Duration : 10 minutes  Patient Position: Supine                                                                HEP: As above; handouts given to patient for all exercises. Treatment/Session Assessment:    · Response to Treatment:  Patient reported some pain with suction cup but tolerated session well. He was advised to take ibuprofen tonight and ice if necessary. Iced at the end of session today. · Compliance with Program/Exercises: compliant most of the time. · Recommendations/Intent for next treatment session: \"Next visit will focus on advancements to more challenging activities\".     Total Treatment Duration: 65 minutes  PT Patient Time In/Time Out  Time In: 3210  Time Out: [place], [place]

## 2018-06-28 ENCOUNTER — HOSPITAL ENCOUNTER (OUTPATIENT)
Dept: PHYSICAL THERAPY | Age: 28
Discharge: HOME | End: 2018-06-28
Payer: COMMERCIAL

## 2018-06-28 PROCEDURE — 97140 MANUAL THERAPY 1/> REGIONS: CPT

## 2018-06-28 NOTE — PROGRESS NOTES
Krysta Srivastava  : 1990  Primary: Shanti Aye Bravo  Secondary:  [address] at [address], [place], [address] [place] [address]  [phone]   [phone]         OUTPATIENT PHYSICAL THERAPY:Daily Note 2018    ICD-10: Treatment Diagnosis: laceration without foreign body of other part of head, initial encounter (S01.81XA)  Treatment Diagnosis 2: Open bite of other part of head, initial encounter (S01.85XA)  Treatment Diagnosis 3: headache (R51)  Precautions: None  Allergies:   Review of patient's allergies indicates no known allergies. Fall Risk Score: 1 (? 5 = High Risk)  MD Orders: Evaluate and Treat MEDICAL/REFERRING DIAGNOSIS:  Laceration without foreign body of other part of head, initial encounter Daniel Berrios  Open bite of other part of head, initial encounter [S01.85XA]   DATE OF ONSET: 17  REFERRING PHYSICIAN: Verito Rubio [facility] Street: not scheduled     INITIAL ASSESSMENT:  Mr. Brianda Barry is a 32 y.o. male presenting with scalp and head tenderness, pain, numbness, and associated nausea due to scar tissue from a dog bite he incurred 17. 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 The dog reset the original bite so he incurred 2 bites from the dog. He reported significant tearing of the skin of his scalp that required surgery and 60 staples. He currently reports significant tenderness, hypersensitivity, pain, and numbness of the areas involving the bite. He is eager to improve his symptoms and reduce overall discomfort. He is a good candidate for skilled intervention with services to include manual therapy, modalities as needed, therapeutic exercises, postural re-education, and scar desensitization/mobilization. PROBLEM LIST (Impacting functional limitations):  1. Increased Pain  2.  Hypersensitivity, numbness, and nausea INTERVENTIONS PLANNED:  1. Cold  2. Cryotherapy  3. Heat  4. Home Exercise Program (HEP)  5. Manual Therapy  6. Range of Motion (ROM)  7. Therapeutic Exercise/Strengthening  8. scar desensitization/mobilization   TREATMENT PLAN:  Effective Dates: 6/13/2018 TO 7/25/2018. Frequency/Duration: 2 times a week for 4-6 weeks  GOALS: (Goals have been discussed and agreed upon with patient.)  Short-Term Functional Goals: Time Frame: 6/13/2018 to 7/4/2018  1. Patient demonstrates independence with home exercise program without verbal cueing provided by therapist.  2. Improve patient's tolerance to touching his head and performing wash rag desensitization for up to 5 minutes at least 1 time a day. 3. Improve flexibility of bilateral upper trapezius and levator scapulae bilaterally in order to reduce suboccipital and cervical tightness. Discharge Goals: Time Frame: 6/13/2018 to 7/25/2018  1. Improve pain 2/10 at the most with wearing a hat, touching his head, and washing his hair. 2. Improve tolerance to scar mobilization in the clinic and with HEP for up to 10 minutes with minimal to no sensation of nausea and minimal pain. 3. Improve tenderness to palpation and spasm of bilateral suboccipitals and proximal cervical paraspinals in order to reduce head pain. 4. Reduce ear symptoms of fullness/opening and pain with incidence less than 1 time a day. 5. Maintain Neck Disability Index score at 0/50 throughout rehab process. Rehabilitation Potential For Stated Goals: Good  Regarding [place] therapy, I certify that the treatment plan above will be carried out by a therapist or under their direction. Thank you for this referral,  Shawn Lara, PT, DPT, OCS     Referring Physician Signature: Lisbeth Vogt*              Date                    The information in this section was collected on 6/13/2018 (except where otherwise noted).   HISTORY:   History of Present Injury/Illness (Reason for Referral):  Mr. Lance Saucedo is a 32 y.o. male presenting with scalp and head tenderness, pain, numbness, and associated nausea due to scar tissue from a dog bite he incurred 6/27/17. 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 The dog reset the original bite so he incurred 2 bites from the dog. He reported significant tearing of the skin of his scalp that required surgery and 60 staples. He currently reports significant tenderness, hypersensitivity, pain, and numbness of the areas involving the bite. He is eager to improve his symptoms and reduce overall discomfort. Past Medical History/Comorbidities:   Mr. Lance Saucedo  has no past medical history on file. Mr. Lance Saucedo  has no past surgical history on file. Social History/Living Environment:    Patient lives at home with wife and reports no complaints with household chores/ADLs. Prior Level of Function/Work/Activity:  Independent without dysfunction. 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 He is very active and exercise at the gym lifting weights 3 times a week. Dominant Side:         RIGHT  Current Medications:       Current Outpatient Prescriptions:    fish oil and daily multivitamin   Date Last Reviewed:  6/28/2018   Number of Personal Factors/Comorbidities that affect the Plan of Care: 0: LOW COMPLEXITY   EXAMINATION:     Patient denies any increase of symptoms with coughing, sneezing or valsalva maneuver. Patient denies any changes in vision, dizziness, vertigo, drop attacks, black outs, tinnitus, dysphagia, dysarthria, LE symptoms or bowel/bladder dysfunction. Observation/Orthostatic Postural Assessment: Patient sits with moderate forward head, forward shoulders, and thoracic kyphosis that is reversible with cuing. Superior portion of scalp reveals irregular scar across top of head that is completely approximated with scar tissue. No significant deformity of the skull/scalp is noted. Palpation: Gross tenderness to palpation and hypersensitivity of the scalp and scar tissue superiorly. Significant restrictions are noted in all directions to entire scar but mostly to the right portions of the scalp with palpable divot noted. Flexibility mildly limited of upper trapezius and levator scapulae bilaterally. Suboccipitals limited bilaterally. Vertebral-Basilar Screen: Hautant's test is negative. Cranial extension test is negative. ROM:   Cervical extension (degrees): 70°   Cervical flexion: 60°   Cervical left side bend: 50°   Cervical right side bend: 40°   Cervical left rotation: 80°   Cervical right rotation: 70°     Strength: Grossly 5/5 for upper quarter. Joint Mobility:  Mild to moderate limitations noted of suboccipitals with posterior glide of occiput on C1. Mild limitations with bilateral C1 on C2 rotations with cervical spine locked into full flexion. Special Tests: Ligament stress tests performed through upper cervical spine for transverse ligament including Sharp-Pillo test is negative, and [place]-Axis shear test is negative. [place]-Axis side flexion test for integrity of alar ligament is negative. Spurling test is mildly positive on the right. Cervical distraction is for stretch and tension release of neck. Neurovascular testing for thoracic outlet syndrome is negative. Neurological Screen:  Myotomes: Key muscle strength testing for bilateral UE is WNL. Dermatomes: Sensation testing through bilateral upper quadrants for light touch is WNL. Functional Mobility:  Patient is independent and safe with all activities. Reports biggest limitation is sensitivity to the touch of the top of the head. Body Structures Involved:  1. Joints  2. Muscles  3. Ligaments Body Functions Affected:  1. Sensory/Pain  2. Neuromusculoskeletal Activities and Participation Affected:  1.  Community, Social and [place]   Number of elements (examined above) that affect the Plan of Care: 3: MODERATE COMPLEXITY   CLINICAL PRESENTATION:   Presentation: Stable and uncomplicated: LOW COMPLEXITY   CLINICAL DECISION MAKING:   Outcome Measure: Tool Used: Neck Disability Index (NDI)  Score:  Initial: 0/50  Most Recent: X/50 (Date: -- )   Interpretation of Score: The Neck Disability Index is a revised form of the Oswestry Low Back Pain Index and is designed to measure the activities of daily living in person's with neck pain. Each section is scored on a 0-5 scale, 5 representing the greatest disability. The scores of each section are added together for a total score of 50. Medical Necessity:   · Patient is expected to demonstrate progress in strength, range of motion, balance and coordination to improve tolerance to touching his head, performing self scar mobilization, washing hair, and wearing hats. · Skilled intervention continues to be required due to hypersensitivity, head pain, numbness, and nausea associated with pain. Reason for Services/Other Comments:  · Patient continues to require skilled intervention due to increasing complexity of exercises. Use of outcome tool(s) and clinical judgement create a POC that gives a: Clear prediction of patient's progress: LOW COMPLEXITY            TREATMENT:   (In addition to Assessment/Re-Assessment sessions the following treatments were rendered)    Pre-treatment Symptoms/Complaints:  Patient reported less sensitivity of his scar, less nausea, and less ear symptoms. Pain: Initial:   Pain Intensity 1: 0  Pain Location 1: Head  Post Session:  0/10 with minor lightheadedness at the end of session     Therapeutic Exercise: ( ):  Exercises per grid below to improve mobility. Required minimal visual, verbal and manual cues to promote proper body alignment and promote proper body breathing techniques. Progressed resistance, range, repetitions and complexity of movement as indicated.   (used abbreviations: BET-back education training) Date:  6/13/2018 Date:   Date:     Activity/Exercise Parameters Parameters Parameters   Upper trapezius stretch 3 x 30 sec with hands     Levator scapulae stretch 3 x 30 sec with hands                                       Manual Therapy (    Soft Tissue Mobilization Duration  Duration: 55 Minutes): improve joint and soft tissue mobility  · Supine stretching of upper trapezius and levator scapulae on the right  · Supine traction of cervical spine and suboccipitals  · Supine deep tissue mobilization of suboccipitals on the right  · Supine deep tissue mobilization of the scalp scar tissue with cream and with and without suction cup and gliding of the scars in all directions  (Used abbreviations: MET - muscle energy technique; PNF - proprioceptive neuromuscular facilitation; NMR - neuromuscular re-education; a/p - anterior to posterior; p/a - posterior to anterior; NT - not tested)    Therapeutic Modalities: for edema and pain : patient denied ice today                                                                                              HEP: As above; handouts given to patient for all exercises. Treatment/Session Assessment:    · Response to Treatment:  Patient reported no complaints with session and only intermittent and mild nausea. Tolerated session well overall. Continue to progress scar mobilizations as tolerated. · Compliance with Program/Exercises: compliant most of the time. · Recommendations/Intent for next treatment session: \"Next visit will focus on advancements to more challenging activities\".     Total Treatment Duration: 55 minutes  PT Patient Time In/Time Out  Time In: 1530  Time Out: [facility],

## 2018-06-29 NOTE — PROGRESS NOTES
Patient called on 6/29/2018 and reported he was really nauseated from yesterdays session. He was advised to ensure he takes ibuprofen before his next session like he has for every session prior. And to ensure he either takes ice to go or ices in the clinic (in supine with a pillow to stabilize the ice and a chair to block the pillow.     HUEY GarnerT

## 2018-07-02 ENCOUNTER — HOSPITAL ENCOUNTER (OUTPATIENT)
Dept: PHYSICAL THERAPY | Age: 28
Discharge: HOME | End: 2018-07-02
Payer: COMMERCIAL

## 2018-07-02 PROCEDURE — 97140 MANUAL THERAPY 1/> REGIONS: CPT

## 2018-07-03 NOTE — PROGRESS NOTES
Ayaz Olvin  : 1990  Primary: Chris Cuadra Medriswisam  Secondary:  [facility] at [address], [address]  [phone]   [phone]         OUTPATIENT PHYSICAL THERAPY:Daily Note 7/3/2018    ICD-10: Treatment Diagnosis: laceration without foreign body of other part of head, initial encounter (S01.81XA)  Treatment Diagnosis 2: Open bite of other part of head, initial encounter (S01.85XA)  Treatment Diagnosis 3: headache (R51)  Precautions: None  Allergies:   Review of patient's allergies indicates no known allergies. Fall Risk Score: 1 (? 5 = High Risk)  MD Orders: Evaluate and Treat MEDICAL/REFERRING DIAGNOSIS:  Laceration without foreign body of other part of head, initial encounter Roseanne Patton  Open bite of other part of head, initial encounter [S01.85XA]   DATE OF ONSET: 17  REFERRING PHYSICIAN: Verito Rubio [facility] Street: not scheduled     INITIAL ASSESSMENT:  Mr. Sharia Brittle is a 32 y.o. male presenting with scalp and head tenderness, pain, numbness, and associated nausea due to scar tissue from a dog bite he incurred 17. 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 The dog reset the original bite so he incurred 2 bites from the dog. He reported significant tearing of the skin of his scalp that required surgery and 60 staples. He currently reports significant tenderness, hypersensitivity, pain, and numbness of the areas involving the bite. He is eager to improve his symptoms and reduce overall discomfort. He is a good candidate for skilled intervention with services to include manual therapy, modalities as needed, therapeutic exercises, postural re-education, and scar desensitization/mobilization. PROBLEM LIST (Impacting functional limitations):  1. Increased Pain  2.  Hypersensitivity, numbness, and nausea INTERVENTIONS PLANNED:  1. Cold  2. Cryotherapy  3. Heat  4. Home Exercise Program (HEP)  5. Manual Therapy  6. Range of Motion (ROM)  7. Therapeutic Exercise/Strengthening  8. scar desensitization/mobilization   TREATMENT PLAN:  Effective Dates: 6/13/2018 TO 7/25/2018. Frequency/Duration: 2 times a week for 4-6 weeks  GOALS: (Goals have been discussed and agreed upon with patient.)  Short-Term Functional Goals: Time Frame: 6/13/2018 to 7/4/2018  1. Patient demonstrates independence with home exercise program without verbal cueing provided by therapist.  2. Improve patient's tolerance to touching his head and performing wash rag desensitization for up to 5 minutes at least 1 time a day. 3. Improve flexibility of bilateral upper trapezius and levator scapulae bilaterally in order to reduce suboccipital and cervical tightness. Discharge Goals: Time Frame: 6/13/2018 to 7/25/2018  1. Improve pain 2/10 at the most with wearing a hat, touching his head, and washing his hair. 2. Improve tolerance to scar mobilization in the clinic and with HEP for up to 10 minutes with minimal to no sensation of nausea and minimal pain. 3. Improve tenderness to palpation and spasm of bilateral suboccipitals and proximal cervical paraspinals in order to reduce head pain. 4. Reduce ear symptoms of fullness/opening and pain with incidence less than 1 time a day. 5. Maintain Neck Disability Index score at 0/50 throughout rehab process. Rehabilitation Potential For Stated Goals: Good  Regarding Adonis French therapy, I certify that the treatment plan above will be carried out by a therapist or under their direction. Thank you for this referral,  Janeth Styles, PT, DPT, OCS     Referring Physician Signature: Feliberto Burris*              Date                    The information in this section was collected on 6/13/2018 (except where otherwise noted).   HISTORY:   History of Present Injury/Illness (Reason for Referral):  Mr. Cesar Rudolph is a 32 y.o. male presenting with scalp and head tenderness, pain, numbness, and associated nausea due to scar tissue from a dog bite he incurred 6/27/17. 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 The dog reset the original bite so he incurred 2 bites from the dog. He reported significant tearing of the skin of his scalp that required surgery and 60 staples. He currently reports significant tenderness, hypersensitivity, pain, and numbness of the areas involving the bite. He is eager to improve his symptoms and reduce overall discomfort. Past Medical History/Comorbidities:   Mr. Cesar Rudolph  has no past medical history on file. Mr. Cesar Rudolph  has no past surgical history on file. Social History/Living Environment:    Patient lives at home with wife and reports no complaints with household chores/ADLs. Prior Level of Function/Work/Activity:  Independent without dysfunction. 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 He is very active and exercise at the gym lifting weights 3 times a week. Dominant Side:         RIGHT  Current Medications:       Current Outpatient Prescriptions:    fish oil and daily multivitamin   Date Last Reviewed:  7/3/2018   Number of Personal Factors/Comorbidities that affect the Plan of Care: 0: LOW COMPLEXITY   EXAMINATION:     Patient denies any increase of symptoms with coughing, sneezing or valsalva maneuver. Patient denies any changes in vision, dizziness, vertigo, drop attacks, black outs, tinnitus, dysphagia, dysarthria, LE symptoms or bowel/bladder dysfunction. Observation/Orthostatic Postural Assessment: Patient sits with moderate forward head, forward shoulders, and thoracic kyphosis that is reversible with cuing. Superior portion of scalp reveals irregular scar across top of head that is completely approximated with scar tissue. No significant deformity of the skull/scalp is noted. Palpation: Gross tenderness to palpation and hypersensitivity of the scalp and scar tissue superiorly. Significant restrictions are noted in all directions to entire scar but mostly to the right portions of the scalp with palpable divot noted. Flexibility mildly limited of upper trapezius and levator scapulae bilaterally. Suboccipitals limited bilaterally. Vertebral-Basilar Screen: Hautant's test is negative. Cranial extension test is negative. ROM:   Cervical extension (degrees): 70°   Cervical flexion: 60°   Cervical left side bend: 50°   Cervical right side bend: 40°   Cervical left rotation: 80°   Cervical right rotation: 70°     Strength: Grossly 5/5 for upper quarter. Joint Mobility:  Mild to moderate limitations noted of suboccipitals with posterior glide of occiput on C1. Mild limitations with bilateral C1 on C2 rotations with cervical spine locked into full flexion. Special Tests: Ligament stress tests performed through upper cervical spine for transverse ligament including Sharp-Pillo test is negative, and [place]-Axis shear test is negative. [place]-Axis side flexion test for integrity of alar ligament is negative. Spurling test is mildly positive on the right. Cervical distraction is for stretch and tension release of neck. Neurovascular testing for thoracic outlet syndrome is negative. Neurological Screen:  Myotomes: Key muscle strength testing for bilateral UE is WNL. Dermatomes: Sensation testing through bilateral upper quadrants for light touch is WNL. Functional Mobility:  Patient is independent and safe with all activities. Reports biggest limitation is sensitivity to the touch of the top of the head. Body Structures Involved:  1. Joints  2. Muscles  3. Ligaments Body Functions Affected:  1. Sensory/Pain  2. Neuromusculoskeletal Activities and Participation Affected:  1.  Community, Social and [place]   Number of elements (examined above) that affect the Plan of Care: 3: MODERATE COMPLEXITY   CLINICAL PRESENTATION:   Presentation: Stable and uncomplicated: LOW COMPLEXITY   CLINICAL DECISION MAKING:   Outcome Measure: Tool Used: Neck Disability Index (NDI)  Score:  Initial: 0/50  Most Recent: X/50 (Date: -- )   Interpretation of Score: The Neck Disability Index is a revised form of the Oswestry Low Back Pain Index and is designed to measure the activities of daily living in person's with neck pain. Each section is scored on a 0-5 scale, 5 representing the greatest disability. The scores of each section are added together for a total score of 50. Medical Necessity:   · Patient is expected to demonstrate progress in strength, range of motion, balance and coordination to improve tolerance to touching his head, performing self scar mobilization, washing hair, and wearing hats. · Skilled intervention continues to be required due to hypersensitivity, head pain, numbness, and nausea associated with pain. Reason for Services/Other Comments:  · Patient continues to require skilled intervention due to increasing complexity of exercises. Use of outcome tool(s) and clinical judgement create a POC that gives a: Clear prediction of patient's progress: LOW COMPLEXITY            TREATMENT:   (In addition to Assessment/Re-Assessment sessions the following treatments were rendered)    Pre-treatment Symptoms/Complaints:  Patient reports having increased pain and nausea after last session. He took ibuprofen prior to today's sessions. Pain: Initial:   Pain Intensity 1: 0  Pain Location 1: Head  Post Session: 0/20, some heaviness reported, but no nausea     Therapeutic Exercise: ( ):  Exercises per grid below to improve mobility. Required minimal visual, verbal and manual cues to promote proper body alignment and promote proper body breathing techniques.   Progressed resistance, range, repetitions and complexity of movement as indicated. (used abbreviations: BET-back education training) Date:  6/13/2018 Date:   Date:     Activity/Exercise Parameters Parameters Parameters   Upper trapezius stretch 3 x 30 sec with hands     Levator scapulae stretch 3 x 30 sec with hands                                       Manual Therapy (    Soft Tissue Mobilization Duration  Duration: 55 Minutes): improve joint and soft tissue mobility  · Supine stretching of upper trapezius and levator scapulae on the right  · Supine traction of cervical spine and suboccipitals  · Supine deep tissue mobilization of suboccipitals on the right  · Supine deep tissue mobilization of the scalp scar tissue with cream and gliding of the scars in all directions  (Used abbreviations: MET - muscle energy technique; PNF - proprioceptive neuromuscular facilitation; NMR - neuromuscular re-education; a/p - anterior to posterior; p/a - posterior to anterior; NT - not tested)    Therapeutic Modalities: for edema and pain : patient denied ice today                                                                                              HEP: As above; handouts given to patient for all exercises. Treatment/Session Assessment:    · Response to Treatment:  Patient with minimal nausea reported during treatment today with few rest breaks required. Unable to use suction cup today due to patient's hair being too long, but good response to manual soft tissue mobilization. Much improvement in tissue excursion over the last few treatments. Some \"heaviness\" reported at end of treatment today, but no nausea. Patient denied ice. · Compliance with Program/Exercises: compliant most of the time. · Recommendations/Intent for next treatment session: \"Next visit will focus on advancements to more challenging activities\".     Total Treatment Duration: 55 minutes  PT Patient Time In/Time Out  Time In: 1530  Time Out: 8300 Kevin Valenzuela, PT

## 2018-07-05 ENCOUNTER — HOSPITAL ENCOUNTER (OUTPATIENT)
Dept: PHYSICAL THERAPY | Age: 28
Discharge: HOME | End: 2018-07-05
Payer: COMMERCIAL

## 2018-07-05 PROCEDURE — 97140 MANUAL THERAPY 1/> REGIONS: CPT

## 2018-07-05 NOTE — PROGRESS NOTES
Karely Camejo  : 1990  Primary: Apolinar Robby aMry  Secondary:  [address] at [address], [place], [address] [place] [address]  [phone]   [phone]         OUTPATIENT PHYSICAL THERAPY:Daily Note 2018    ICD-10: Treatment Diagnosis: laceration without foreign body of other part of head, initial encounter (S01.81XA)  Treatment Diagnosis 2: Open bite of other part of head, initial encounter (S01.85XA)  Treatment Diagnosis 3: headache (R51)  Precautions: None  Allergies:   Review of patient's allergies indicates no known allergies. Fall Risk Score: 1 (? 5 = High Risk)  MD Orders: Evaluate and Treat MEDICAL/REFERRING DIAGNOSIS:  Laceration without foreign body of other part of head, initial encounter Sherylcorky Beckshelby  Open bite of other part of head, initial encounter [S01.85XA]   DATE OF ONSET: 17  REFERRING PHYSICIAN: Verito Rubio [facility] Street: not scheduled     INITIAL ASSESSMENT:  Mr. Orlando Perez is a 32 y.o. male presenting with scalp and head tenderness, pain, numbness, and associated nausea due to scar tissue from a dog bite he incurred 17. 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 The dog reset the original bite so he incurred 2 bites from the dog. He reported significant tearing of the skin of his scalp that required surgery and 60 staples. He currently reports significant tenderness, hypersensitivity, pain, and numbness of the areas involving the bite. He is eager to improve his symptoms and reduce overall discomfort. He is a good candidate for skilled intervention with services to include manual therapy, modalities as needed, therapeutic exercises, postural re-education, and scar desensitization/mobilization. PROBLEM LIST (Impacting functional limitations):  1. Increased Pain  2.  Hypersensitivity, numbness, and nausea INTERVENTIONS PLANNED:  1. Cold  2. Cryotherapy  3. Heat  4. Home Exercise Program (HEP)  5. Manual Therapy  6. Range of Motion (ROM)  7. Therapeutic Exercise/Strengthening  8. scar desensitization/mobilization   TREATMENT PLAN:  Effective Dates: 6/13/2018 TO 7/25/2018. Frequency/Duration: 2 times a week for 4-6 weeks  GOALS: (Goals have been discussed and agreed upon with patient.)  Short-Term Functional Goals: Time Frame: 6/13/2018 to 7/4/2018  1. Patient demonstrates independence with home exercise program without verbal cueing provided by therapist.  2. Improve patient's tolerance to touching his head and performing wash rag desensitization for up to 5 minutes at least 1 time a day. 3. Improve flexibility of bilateral upper trapezius and levator scapulae bilaterally in order to reduce suboccipital and cervical tightness. Discharge Goals: Time Frame: 6/13/2018 to 7/25/2018  1. Improve pain 2/10 at the most with wearing a hat, touching his head, and washing his hair. 2. Improve tolerance to scar mobilization in the clinic and with HEP for up to 10 minutes with minimal to no sensation of nausea and minimal pain. 3. Improve tenderness to palpation and spasm of bilateral suboccipitals and proximal cervical paraspinals in order to reduce head pain. 4. Reduce ear symptoms of fullness/opening and pain with incidence less than 1 time a day. 5. Maintain Neck Disability Index score at 0/50 throughout rehab process. Rehabilitation Potential For Stated Goals: Good  Regarding Alexandre Miller therapy, I certify that the treatment plan above will be carried out by a therapist or under their direction. Thank you for this referral,  Irby Galeazzi, PT, DPT, OCS     Referring Physician Signature: Wendy Johnson*              Date                    The information in this section was collected on 6/13/2018 (except where otherwise noted).   HISTORY:   History of Present Injury/Illness (Reason for Referral):  Mr. Ismael Head is a 32 y.o. male presenting with scalp and head tenderness, pain, numbness, and associated nausea due to scar tissue from a dog bite he incurred 6/27/17. 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 The dog reset the original bite so he incurred 2 bites from the dog. He reported significant tearing of the skin of his scalp that required surgery and 60 staples. He currently reports significant tenderness, hypersensitivity, pain, and numbness of the areas involving the bite. He is eager to improve his symptoms and reduce overall discomfort. Past Medical History/Comorbidities:   Mr. Ismael Head  has no past medical history on file. Mr. Ismael Head  has no past surgical history on file. Social History/Living Environment:    Patient lives at home with wife and reports no complaints with household chores/ADLs. Prior Level of Function/Work/Activity:  Independent without dysfunction. 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 He is very active and exercise at the gym lifting weights 3 times a week. Dominant Side:         RIGHT  Current Medications:       Current Outpatient Prescriptions:    fish oil and daily multivitamin   Date Last Reviewed:  7/5/2018   Number of Personal Factors/Comorbidities that affect the Plan of Care: 0: LOW COMPLEXITY   EXAMINATION:     Patient denies any increase of symptoms with coughing, sneezing or valsalva maneuver. Patient denies any changes in vision, dizziness, vertigo, drop attacks, black outs, tinnitus, dysphagia, dysarthria, LE symptoms or bowel/bladder dysfunction. Observation/Orthostatic Postural Assessment: Patient sits with moderate forward head, forward shoulders, and thoracic kyphosis that is reversible with cuing. Superior portion of scalp reveals irregular scar across top of head that is completely approximated with scar tissue. No significant deformity of the skull/scalp is noted. Palpation: Gross tenderness to palpation and hypersensitivity of the scalp and scar tissue superiorly. Significant restrictions are noted in all directions to entire scar but mostly to the right portions of the scalp with palpable divot noted. Flexibility mildly limited of upper trapezius and levator scapulae bilaterally. Suboccipitals limited bilaterally. Vertebral-Basilar Screen: Hautant's test is negative. Cranial extension test is negative. ROM:   Cervical extension (degrees): 70°   Cervical flexion: 60°   Cervical left side bend: 50°   Cervical right side bend: 40°   Cervical left rotation: 80°   Cervical right rotation: 70°     Strength: Grossly 5/5 for upper quarter. Joint Mobility:  Mild to moderate limitations noted of suboccipitals with posterior glide of occiput on C1. Mild limitations with bilateral C1 on C2 rotations with cervical spine locked into full flexion. Special Tests: Ligament stress tests performed through upper cervical spine for transverse ligament including Sharp-Pillo test is negative, and [place]-Axis shear test is negative. [place]-Axis side flexion test for integrity of alar ligament is negative. Spurling test is mildly positive on the right. Cervical distraction is for stretch and tension release of neck. Neurovascular testing for thoracic outlet syndrome is negative. Neurological Screen:  Myotomes: Key muscle strength testing for bilateral UE is WNL. Dermatomes: Sensation testing through bilateral upper quadrants for light touch is WNL. Functional Mobility:  Patient is independent and safe with all activities. Reports biggest limitation is sensitivity to the touch of the top of the head. Body Structures Involved:  1. Joints  2. Muscles  3. Ligaments Body Functions Affected:  1. Sensory/Pain  2. Neuromusculoskeletal Activities and Participation Affected:  1.  Community, Social and [place]   Number of elements (examined above) that affect the Plan of Care: 3: MODERATE COMPLEXITY   CLINICAL PRESENTATION:   Presentation: Stable and uncomplicated: LOW COMPLEXITY   CLINICAL DECISION MAKING:   Outcome Measure: Tool Used: Neck Disability Index (NDI)  Score:  Initial: 0/50  Most Recent: X/50 (Date: -- )   Interpretation of Score: The Neck Disability Index is a revised form of the Oswestry Low Back Pain Index and is designed to measure the activities of daily living in person's with neck pain. Each section is scored on a 0-5 scale, 5 representing the greatest disability. The scores of each section are added together for a total score of 50. Medical Necessity:   · Patient is expected to demonstrate progress in strength, range of motion, balance and coordination to improve tolerance to touching his head, performing self scar mobilization, washing hair, and wearing hats. · Skilled intervention continues to be required due to hypersensitivity, head pain, numbness, and nausea associated with pain. Reason for Services/Other Comments:  · Patient continues to require skilled intervention due to increasing complexity of exercises. Use of outcome tool(s) and clinical judgement create a POC that gives a: Clear prediction of patient's progress: LOW COMPLEXITY            TREATMENT:   (In addition to Assessment/Re-Assessment sessions the following treatments were rendered)    Pre-treatment Symptoms/Complaints:  Patient reported less complaints since the start of therapy. Pain: Initial:   Pain Intensity 1: 0  Pain Location 1: Head  Post Session: 0/20, some heaviness reported, but no nausea     Therapeutic Exercise: ( ):  Exercises per grid below to improve mobility. Required minimal visual, verbal and manual cues to promote proper body alignment and promote proper body breathing techniques. Progressed resistance, range, repetitions and complexity of movement as indicated.   (used abbreviations: BET-back education training) Date:  6/13/2018 Date:   Date:     Activity/Exercise Parameters Parameters Parameters   Upper trapezius stretch 3 x 30 sec with hands     Levator scapulae stretch 3 x 30 sec with hands                                       Manual Therapy (    Soft Tissue Mobilization Duration  Duration: 55 Minutes): improve joint and soft tissue mobility  · Supine stretching of upper trapezius and levator scapulae on the right  · Supine traction of cervical spine and suboccipitals  · Supine deep tissue mobilization of suboccipitals on the right  · Supine deep tissue mobilization of the scalp scar tissue with cream and suction cup and gliding of the scars in all directions  (Used abbreviations: MET - muscle energy technique; PNF - proprioceptive neuromuscular facilitation; NMR - neuromuscular re-education; a/p - anterior to posterior; p/a - posterior to anterior; NT - not tested)    Therapeutic Modalities: for edema and pain                                                                                               HEP: As above; handouts given to patient for all exercises. Treatment/Session Assessment:    · Response to Treatment:  Patient reported nausea with suction cup over right portion of scalp scar with suction cup. Abated with rest and patient reported no complaints at the end of session. · Compliance with Program/Exercises: compliant most of the time. · Recommendations/Intent for next treatment session: \"Next visit will focus on advancements to more challenging activities\".     Total Treatment Duration: 55 minutes  PT Patient Time In/Time Out  Time In: 5327  Time Out: Silvia Chauhan

## 2018-07-09 ENCOUNTER — APPOINTMENT (OUTPATIENT)
Dept: PHYSICAL THERAPY | Age: 28
End: 2018-07-09
Payer: COMMERCIAL

## 2018-07-11 ENCOUNTER — HOSPITAL ENCOUNTER (OUTPATIENT)
Dept: PHYSICAL THERAPY | Age: 28
Discharge: HOME | End: 2018-07-11
Payer: COMMERCIAL

## 2018-07-11 PROCEDURE — 97140 MANUAL THERAPY 1/> REGIONS: CPT

## 2018-07-11 NOTE — PROGRESS NOTES
Soha Lizandro  : 1990  Primary: Carly Jessica Hernandez  Secondary:  [address] at [address], [place], [address] [place] [address]  [phone]   [phone]         OUTPATIENT PHYSICAL THERAPY:Daily Note 2018    ICD-10: Treatment Diagnosis: laceration without foreign body of other part of head, initial encounter (S01.81XA)  Treatment Diagnosis 2: Open bite of other part of head, initial encounter (S01.85XA)  Treatment Diagnosis 3: headache (R51)  Precautions: None  Allergies:   Review of patient's allergies indicates no known allergies. Fall Risk Score: 1 (? 5 = High Risk)  MD Orders: Evaluate and Treat MEDICAL/REFERRING DIAGNOSIS:  Laceration without foreign body of other part of head, initial encounter Jewels Beckwith  Open bite of other part of head, initial encounter [S01.85XA]   DATE OF ONSET: 17  REFERRING PHYSICIAN: Verito Rubio [facility]: not scheduled     INITIAL ASSESSMENT:  Mr. Clem Swenson is a 32 y.o. male presenting with scalp and head tenderness, pain, numbness, and associated nausea due to scar tissue from a dog bite he incurred 17. 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 The dog reset the original bite so he incurred 2 bites from the dog. He reported significant tearing of the skin of his scalp that required surgery and 60 staples. He currently reports significant tenderness, hypersensitivity, pain, and numbness of the areas involving the bite. He is eager to improve his symptoms and reduce overall discomfort. He is a good candidate for skilled intervention with services to include manual therapy, modalities as needed, therapeutic exercises, postural re-education, and scar desensitization/mobilization. PROBLEM LIST (Impacting functional limitations):  1. Increased Pain  2.  Hypersensitivity, numbness, and nausea INTERVENTIONS PLANNED:  1. Cold  2. Cryotherapy  3. Heat  4. Home Exercise Program (HEP)  5. Manual Therapy  6. Range of Motion (ROM)  7. Therapeutic Exercise/Strengthening  8. scar desensitization/mobilization   TREATMENT PLAN:  Effective Dates: 6/13/2018 TO 7/25/2018. Frequency/Duration: 2 times a week for 4-6 weeks  GOALS: (Goals have been discussed and agreed upon with patient.)  Short-Term Functional Goals: Time Frame: 6/13/2018 to 7/4/2018  1. Patient demonstrates independence with home exercise program without verbal cueing provided by therapist.  2. Improve patient's tolerance to touching his head and performing wash rag desensitization for up to 5 minutes at least 1 time a day. 3. Improve flexibility of bilateral upper trapezius and levator scapulae bilaterally in order to reduce suboccipital and cervical tightness. Discharge Goals: Time Frame: 6/13/2018 to 7/25/2018  1. Improve pain 2/10 at the most with wearing a hat, touching his head, and washing his hair. 2. Improve tolerance to scar mobilization in the clinic and with HEP for up to 10 minutes with minimal to no sensation of nausea and minimal pain. 3. Improve tenderness to palpation and spasm of bilateral suboccipitals and proximal cervical paraspinals in order to reduce head pain. 4. Reduce ear symptoms of fullness/opening and pain with incidence less than 1 time a day. 5. Maintain Neck Disability Index score at 0/50 throughout rehab process. Rehabilitation Potential For Stated Goals: Good  Regarding Alexandre Miller therapy, I certify that the treatment plan above will be carried out by a therapist or under their direction. Thank you for this referral,  Irby Galeazzi, PT, DPT, OCS     Referring Physician Signature: Wendy Johnson*              Date                    The information in this section was collected on 6/13/2018 (except where otherwise noted).   HISTORY:   History of Present Injury/Illness (Reason for Referral):  Mr. Manny Morris is a 32 y.o. male presenting with scalp and head tenderness, pain, numbness, and associated nausea due to scar tissue from a dog bite he incurred 6/27/17. 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 The dog reset the original bite so he incurred 2 bites from the dog. He reported significant tearing of the skin of his scalp that required surgery and 60 staples. He currently reports significant tenderness, hypersensitivity, pain, and numbness of the areas involving the bite. He is eager to improve his symptoms and reduce overall discomfort. Past Medical History/Comorbidities:   Mr. Manny Morris  has no past medical history on file. Mr. Manny Morris  has no past surgical history on file. Social History/Living Environment:    Patient lives at home with wife and reports no complaints with household chores/ADLs. Prior Level of Function/Work/Activity:  Independent without dysfunction. 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 He is very active and exercise at the gym lifting weights 3 times a week. Dominant Side:         RIGHT  Current Medications:       Current Outpatient Prescriptions:    fish oil and daily multivitamin   Date Last Reviewed:  7/11/2018   Number of Personal Factors/Comorbidities that affect the Plan of Care: 0: LOW COMPLEXITY   EXAMINATION:     Patient denies any increase of symptoms with coughing, sneezing or valsalva maneuver. Patient denies any changes in vision, dizziness, vertigo, drop attacks, black outs, tinnitus, dysphagia, dysarthria, LE symptoms or bowel/bladder dysfunction. Observation/Orthostatic Postural Assessment: Patient sits with moderate forward head, forward shoulders, and thoracic kyphosis that is reversible with cuing. Superior portion of scalp reveals irregular scar across top of head that is completely approximated with scar tissue. No significant deformity of the skull/scalp is noted. Palpation: Gross tenderness to palpation and hypersensitivity of the scalp and scar tissue superiorly. Significant restrictions are noted in all directions to entire scar but mostly to the right portions of the scalp with palpable divot noted. Flexibility mildly limited of upper trapezius and levator scapulae bilaterally. Suboccipitals limited bilaterally. Vertebral-Basilar Screen: Hautant's test is negative. Cranial extension test is negative. ROM:   Cervical extension (degrees): 70°   Cervical flexion: 60°   Cervical left side bend: 50°   Cervical right side bend: 40°   Cervical left rotation: 80°   Cervical right rotation: 70°     Strength: Grossly 5/5 for upper quarter. Joint Mobility:  Mild to moderate limitations noted of suboccipitals with posterior glide of occiput on C1. Mild limitations with bilateral C1 on C2 rotations with cervical spine locked into full flexion. Special Tests: Ligament stress tests performed through upper cervical spine for transverse ligament including Sharp-Pillo test is negative, and Pittsburgh-Axis shear test is negative. Pittsburgh-Axis side flexion test for integrity of alar ligament is negative. Spurling test is mildly positive on the right. Cervical distraction is for stretch and tension release of neck. Neurovascular testing for thoracic outlet syndrome is negative. Neurological Screen:  Myotomes: Key muscle strength testing for bilateral UE is WNL. Dermatomes: Sensation testing through bilateral upper quadrants for light touch is WNL. Functional Mobility:  Patient is independent and safe with all activities. Reports biggest limitation is sensitivity to the touch of the top of the head. Body Structures Involved:  1. Joints  2. Muscles  3. Ligaments Body Functions Affected:  1. Sensory/Pain  2. Neuromusculoskeletal Activities and Participation Affected:  1.  Community, Social and [place]   Number of elements (examined above) that affect the Plan of Care: 3: MODERATE COMPLEXITY   CLINICAL PRESENTATION:   Presentation: Stable and uncomplicated: LOW COMPLEXITY   CLINICAL DECISION MAKING:   Outcome Measure: Tool Used: Neck Disability Index (NDI)  Score:  Initial: 0/50  Most Recent: X/50 (Date: -- )   Interpretation of Score: The Neck Disability Index is a revised form of the Oswestry Low Back Pain Index and is designed to measure the activities of daily living in person's with neck pain. Each section is scored on a 0-5 scale, 5 representing the greatest disability. The scores of each section are added together for a total score of 50. Medical Necessity:   · Patient is expected to demonstrate progress in strength, range of motion, balance and coordination to improve tolerance to touching his head, performing self scar mobilization, washing hair, and wearing hats. · Skilled intervention continues to be required due to hypersensitivity, head pain, numbness, and nausea associated with pain. Reason for Services/Other Comments:  · Patient continues to require skilled intervention due to increasing complexity of exercises. Use of outcome tool(s) and clinical judgement create a POC that gives a: Clear prediction of patient's progress: LOW COMPLEXITY            TREATMENT:   (In addition to Assessment/Re-Assessment sessions the following treatments were rendered)    Pre-treatment Symptoms/Complaints:  Patient reported no complaints since the start of therapy. Less nausea and ear fullness noted. Pain: Initial:   Pain Intensity 1: 0  Pain Location 1: Head  Post Session: 0/10     Therapeutic Exercise: ( ):  Exercises per grid below to improve mobility. Required minimal visual, verbal and manual cues to promote proper body alignment and promote proper body breathing techniques. Progressed resistance, range, repetitions and complexity of movement as indicated.   (used abbreviations: BET-back education training) Date:  6/13/2018 Date:   Date:     Activity/Exercise Parameters Parameters Parameters   Upper trapezius stretch 3 x 30 sec with hands     Levator scapulae stretch 3 x 30 sec with hands                                       Manual Therapy (    Soft Tissue Mobilization Duration  Duration: 55 Minutes): improve joint and soft tissue mobility  · Supine stretching of upper trapezius and levator scapulae on the right  · Supine traction of cervical spine and suboccipitals  · Supine deep tissue mobilization of suboccipitals on the right  · Supine deep tissue mobilization of the scalp scar tissue with cream and suction cup and gliding of the scars in all directions  (Used abbreviations: MET - muscle energy technique; PNF - proprioceptive neuromuscular facilitation; NMR - neuromuscular re-education; a/p - anterior to posterior; p/a - posterior to anterior; NT - not tested)    Therapeutic Modalities: for edema and pain                              Cervical Spine Cold  Type: Cold/ice pack  Duration : 10 minutes  Patient Position: Supine                                                                HEP: As above; handouts given to patient for all exercises. Treatment/Session Assessment:    · Response to Treatment:  Patient reported no nausea or ear symptoms today. Tolerated session well. Will continue to progress scar mobilizations as tolerated. · Compliance with Program/Exercises: compliant most of the time. · Recommendations/Intent for next treatment session: \"Next visit will focus on advancements to more challenging activities\".     Total Treatment Duration: 65 minutes  PT Patient Time In/Time Out  Time In: 1330  Time Out: [facility]

## 2018-07-12 ENCOUNTER — HOSPITAL ENCOUNTER (OUTPATIENT)
Dept: PHYSICAL THERAPY | Age: 28
Discharge: HOME | End: 2018-07-12
Payer: COMMERCIAL

## 2018-07-12 PROCEDURE — 97140 MANUAL THERAPY 1/> REGIONS: CPT

## 2018-07-12 NOTE — PROGRESS NOTES
Luis Alberto Lyles  : 1990  Primary: Vital Reginald Bravo  Secondary:  [address] at [place] [address], [place], [address]  [phone]   [phone]         OUTPATIENT PHYSICAL THERAPY:Daily Note and Progress Report 2018    ICD-10: Treatment Diagnosis: laceration without foreign body of other part of head, initial encounter (S01.81XA)  Treatment Diagnosis 2: Open bite of other part of head, initial encounter (S01.85XA)  Treatment Diagnosis 3: headache (R51)  Precautions: None  Allergies:   Review of patient's allergies indicates no known allergies. Fall Risk Score: 1 (? 5 = High Risk)  MD Orders: Evaluate and Treat MEDICAL/REFERRING DIAGNOSIS:  Laceration without foreign body of other part of head, initial encounter Juan José Redhead  Open bite of other part of head, initial encounter [S01.85XA]   DATE OF ONSET: 17  REFERRING PHYSICIAN: Verito Rubio [facility] Street: not scheduled     INITIAL ASSESSMENT:  Mr. Hay Avelar is a 32 y.o. male presenting with scalp and head tenderness, pain, numbness, and associated nausea due to scar tissue from a dog bite he incurred 17. 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 The dog reset the original bite so he incurred 2 bites from the dog. He reported significant tearing of the skin of his scalp that required surgery and 60 staples. He currently reports significant tenderness, hypersensitivity, pain, and numbness of the areas involving the bite. He is eager to improve his symptoms and reduce overall discomfort. Patient has been seen for 9 sessions of therapy with significant success. He feels about 60-70% better overall with nausea, ear fullness, and overall pain in the head. He has been performing self scar mobilizations regularly and reports improvements on his own.   Will continue therapy with emphasis on scar mobilization and mobility. He is a good candidate for continued skilled intervention with services to include manual therapy, modalities as needed, therapeutic exercises, postural re-education, and scar desensitization/mobilization. PROBLEM LIST (Impacting functional limitations):  1. Increased Pain  2. Hypersensitivity, numbness, and nausea INTERVENTIONS PLANNED:  1. Cold  2. Cryotherapy  3. Heat  4. Home Exercise Program (HEP)  5. Manual Therapy  6. Range of Motion (ROM)  7. Therapeutic Exercise/Strengthening  8. scar desensitization/mobilization   TREATMENT PLAN:  Effective Dates: 6/13/2018 TO 7/25/2018. Frequency/Duration: 2 times a week for 4-6 weeks  GOALS: (Goals have been discussed and agreed upon with patient.)  Short-Term Functional Goals: Time Frame: 6/13/2018 to 7/4/2018  1. Patient demonstrates independence with home exercise program without verbal cueing provided by therapist. - GOAL MET  2. Improve patient's tolerance to touching his head and performing wash rag desensitization for up to 5 minutes at least 1 time a day. - GOAL MET  3. Improve flexibility of bilateral upper trapezius and levator scapulae bilaterally in order to reduce suboccipital and cervical tightness. - GOAL MET  Discharge Goals: Time Frame: 6/13/2018 to 7/25/2018  1. Improve pain 2/10 at the most with wearing a hat, touching his head, and washing his hair. - ONGOING  2. Improve tolerance to scar mobilization in the clinic and with HEP for up to 10 minutes with minimal to no sensation of nausea and minimal pain. - ONGOING  3. Improve tenderness to palpation and spasm of bilateral suboccipitals and proximal cervical paraspinals in order to reduce head pain. - ONGOING  4. Reduce ear symptoms of fullness/opening and pain with incidence less than 1 time a day. - GOAL MET  5. Maintain Neck Disability Index score at 0/50 throughout rehab process.   - GOAL MET  Rehabilitation Potential For Stated Goals: Good  Regarding Noni chaparro, I certify that the treatment plan above will be carried out by a therapist or under their direction. Thank you for this referral,  Kareen Landa, PT, DPT, OCS     Referring Physician Signature: Caleb Bocanegra*              Date                    The information in this section was collected on 7/12/2018 (except where otherwise noted). HISTORY:   History of Present Injury/Illness (Reason for Referral):  Mr. Kali Ambrose is a 32 y.o. male presenting with scalp and head tenderness, pain, numbness, and associated nausea due to scar tissue from a dog bite he incurred 6/27/17. 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 The dog reset the original bite so he incurred 2 bites from the dog. He reported significant tearing of the skin of his scalp that required surgery and 60 staples. He currently reports significant tenderness, hypersensitivity, pain, and numbness of the areas involving the bite. He is eager to improve his symptoms and reduce overall discomfort. Past Medical History/Comorbidities:   Mr. Kali Ambrose  has no past medical history on file. Mr. Kali Ambrose  has no past surgical history on file. Social History/Living Environment:    Patient lives at home with wife and reports no complaints with household chores/ADLs. Prior Level of Function/Work/Activity:  Independent without dysfunction. 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 He is very active and exercise at the gym lifting weights 3 times a week. Dominant Side:         RIGHT  Current Medications:       Current Outpatient Prescriptions:    fish oil and daily multivitamin   Date Last Reviewed:  7/16/2018   Number of Personal Factors/Comorbidities that affect the Plan of Care: 0: LOW COMPLEXITY   EXAMINATION:     Patient denies any increase of symptoms with coughing, sneezing or valsalva maneuver. Patient denies any changes in vision, dizziness, vertigo, drop attacks, black outs, tinnitus, dysphagia, dysarthria, LE symptoms or bowel/bladder dysfunction. Observation/Orthostatic Postural Assessment: Patient sits with minimal (form moderate) forward head, forward shoulders, and thoracic kyphosis that is reversible with cuing. Superior portion of scalp reveals irregular scar across top of head that is completely approximated with scar tissue. No significant deformity of the skull/scalp is noted. Palpation: Gross tenderness to palpation and hypersensitivity of the scalp and scar tissue superiorly but this has improved since the start of therapy and pain/abnormal sensation is located in specific places. Significant restrictions are noted in all directions to entire scar but mostly to the right portions of the scalp with palpable divot noted. Flexibility mildly limited of upper trapezius and levator scapulae bilaterally. Suboccipitals limited bilaterally. Vertebral-Basilar Screen: Hautant's test is negative. Cranial extension test is negative. ROM:   Cervical extension (degrees): 70°   Cervical flexion: 60°   Cervical left side bend: 50°   Cervical right side bend: 40°   Cervical left rotation: 80°   Cervical right rotation: 70°     Strength: Grossly 5/5 for upper quarter. Joint Mobility:  Mild to moderate limitations noted of suboccipitals with posterior glide of occiput on C1. Mild limitations with bilateral C1 on C2 rotations with cervical spine locked into full flexion. Special Tests: Ligament stress tests performed through upper cervical spine for transverse ligament including Sharp-Pillo test is negative, and Burton-Axis shear test is negative. Burton-Axis side flexion test for integrity of alar ligament is negative. Spurling test is mildly positive on the right. Cervical distraction is for stretch and tension release of neck.  Neurovascular testing for thoracic outlet syndrome is negative. Neurological Screen:  Myotomes: Key muscle strength testing for bilateral UE is WNL. Dermatomes: Sensation testing through bilateral upper quadrants for light touch is WNL. Functional Mobility:  Patient is independent and safe with all activities. Reports biggest limitation is sensitivity to the touch of the top of the head. Body Structures Involved:  1. Joints  2. Muscles  3. Ligaments Body Functions Affected:  1. Sensory/Pain  2. Neuromusculoskeletal Activities and Participation Affected:  1. Community, Social and [place]   Number of elements (examined above) that affect the Plan of Care: 3: MODERATE COMPLEXITY   CLINICAL PRESENTATION:   Presentation: Stable and uncomplicated: LOW COMPLEXITY   CLINICAL DECISION MAKING:   Outcome Measure: Tool Used: Neck Disability Index (NDI)  Score:  Initial: 0/50  Most Recent: X/50 (Date: -- )   Interpretation of Score: The Neck Disability Index is a revised form of the Oswestry Low Back Pain Index and is designed to measure the activities of daily living in person's with neck pain. Each section is scored on a 0-5 scale, 5 representing the greatest disability. The scores of each section are added together for a total score of 50. Medical Necessity:   · Patient is expected to demonstrate progress in strength, range of motion, balance and coordination to improve tolerance to touching his head, performing self scar mobilization, washing hair, and wearing hats. · Skilled intervention continues to be required due to hypersensitivity, head pain, numbness, and nausea associated with pain. Reason for Services/Other Comments:  · Patient continues to require skilled intervention due to increasing complexity of exercises.    Use of outcome tool(s) and clinical judgement create a POC that gives a: Clear prediction of patient's progress: LOW COMPLEXITY            TREATMENT:   (In addition to Assessment/Re-Assessment sessions the following treatments were rendered)    Pre-treatment Symptoms/Complaints:  Patient reported soreness since therapy yesterday but overall no complaints. Pain: Initial:   Pain Intensity 1: 0  Pain Location 1: Head  Post Session: 0/10     Therapeutic Exercise: ( ):  Exercises per grid below to improve mobility. Required minimal visual, verbal and manual cues to promote proper body alignment and promote proper body breathing techniques. Progressed resistance, range, repetitions and complexity of movement as indicated. (used abbreviations: BET-back education training) Date:  6/13/2018 Date:   Date:     Activity/Exercise Parameters Parameters Parameters   Upper trapezius stretch 3 x 30 sec with hands     Levator scapulae stretch 3 x 30 sec with hands                                       Manual Therapy (    Soft Tissue Mobilization Duration  Duration: 55 Minutes): improve joint and soft tissue mobility  · Supine stretching of upper trapezius and levator scapulae on the right  · Supine traction of cervical spine and suboccipitals  · Supine deep tissue mobilization of suboccipitals on the right  · Supine deep tissue mobilization of the scalp scar tissue with cream and suction cup and gliding of the scars in all directions  (Used abbreviations: MET - muscle energy technique; PNF - proprioceptive neuromuscular facilitation; NMR - neuromuscular re-education; a/p - anterior to posterior; p/a - posterior to anterior; NT - not tested)    Therapeutic Modalities: for edema and pain                              Cervical Spine Cold  Type: Cold/ice pack  Duration : 10 minutes  Patient Position: Supine                                                                HEP: As above; handouts given to patient for all exercises. Treatment/Session Assessment:    · Response to Treatment:  Patient reported no new complaints. Tolerated session well. Will continue to progress as tolerated.   · Compliance with Program/Exercises: compliant most of the time.  · Recommendations/Intent for next treatment session: \"Next visit will focus on advancements to more challenging activities\".     Total Treatment Duration: 65 minutes  PT Patient Time In/Time Out  Time In: 1873  Time Out: Silvia Chauhan

## 2018-07-16 ENCOUNTER — HOSPITAL ENCOUNTER (OUTPATIENT)
Dept: PHYSICAL THERAPY | Age: 28
Discharge: HOME | End: 2018-07-16
Payer: COMMERCIAL

## 2018-07-16 PROCEDURE — 97140 MANUAL THERAPY 1/> REGIONS: CPT

## 2018-07-16 NOTE — PROGRESS NOTES
Dipika Barajas  : 1990  Primary: [facility]  Secondary:  [address] at [address], [place], [address] [place] [address]  [phone]   [phone]         OUTPATIENT PHYSICAL THERAPY:Daily Note 2018    ICD-10: Treatment Diagnosis: laceration without foreign body of other part of head, initial encounter (S01.81XA)  Treatment Diagnosis 2: Open bite of other part of head, initial encounter (S01.85XA)  Treatment Diagnosis 3: headache (R51)  Precautions: None  Allergies:   Review of patient's allergies indicates no known allergies. Fall Risk Score: 1 (? 5 = High Risk)  MD Orders: Evaluate and Treat MEDICAL/REFERRING DIAGNOSIS:  Laceration without foreign body of other part of head, initial encounter Rosy Pulling  Open bite of other part of head, initial encounter [S01.85XA]   DATE OF ONSET: 17  REFERRING PHYSICIAN: Verito Rubio [facility] Street: not scheduled     INITIAL ASSESSMENT:  Mr. Jayson Sherwood is a 32 y.o. male presenting with scalp and head tenderness, pain, numbness, and associated nausea due to scar tissue from a dog bite he incurred 17. 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 The dog reset the original bite so he incurred 2 bites from the dog. He reported significant tearing of the skin of his scalp that required surgery and 60 staples. He currently reports significant tenderness, hypersensitivity, pain, and numbness of the areas involving the bite. He is eager to improve his symptoms and reduce overall discomfort. Patient has been seen for 9 sessions of therapy with significant success. He feels about 60-70% better overall with nausea, ear fullness, and overall pain in the head. He has been performing self scar mobilizations regularly and reports improvements on his own.   Will continue therapy with emphasis on scar mobilization and mobility. He is a good candidate for continued skilled intervention with services to include manual therapy, modalities as needed, therapeutic exercises, postural re-education, and scar desensitization/mobilization. PROBLEM LIST (Impacting functional limitations):  1. Increased Pain  2. Hypersensitivity, numbness, and nausea INTERVENTIONS PLANNED:  1. Cold  2. Cryotherapy  3. Heat  4. Home Exercise Program (HEP)  5. Manual Therapy  6. Range of Motion (ROM)  7. Therapeutic Exercise/Strengthening  8. scar desensitization/mobilization   TREATMENT PLAN:  Effective Dates: 6/13/2018 TO 7/25/2018. Frequency/Duration: 2 times a week for 4-6 weeks  GOALS: (Goals have been discussed and agreed upon with patient.)  Short-Term Functional Goals: Time Frame: 6/13/2018 to 7/4/2018  1. Patient demonstrates independence with home exercise program without verbal cueing provided by therapist. - GOAL MET  2. Improve patient's tolerance to touching his head and performing wash rag desensitization for up to 5 minutes at least 1 time a day. - GOAL MET  3. Improve flexibility of bilateral upper trapezius and levator scapulae bilaterally in order to reduce suboccipital and cervical tightness. - GOAL MET  Discharge Goals: Time Frame: 6/13/2018 to 7/25/2018  1. Improve pain 2/10 at the most with wearing a hat, touching his head, and washing his hair. - ONGOING  2. Improve tolerance to scar mobilization in the clinic and with HEP for up to 10 minutes with minimal to no sensation of nausea and minimal pain. - ONGOING  3. Improve tenderness to palpation and spasm of bilateral suboccipitals and proximal cervical paraspinals in order to reduce head pain. - ONGOING  4. Reduce ear symptoms of fullness/opening and pain with incidence less than 1 time a day. - GOAL MET  5. Maintain Neck Disability Index score at 0/50 throughout rehab process.   - GOAL MET  Rehabilitation Potential For Stated Goals: Good  Regarding Tanja Chávez Christiano's therapy, I certify that the treatment plan above will be carried out by a therapist or under their direction. Thank you for this referral,  Christine Marcos, PT, DPT, OCS     Referring Physician Signature: Haydee Cardoso*              Date                    The information in this section was collected on 7/12/2018 (except where otherwise noted). HISTORY:   History of Present Injury/Illness (Reason for Referral):  Mr. Shilo Back is a 32 y.o. male presenting with scalp and head tenderness, pain, numbness, and associated nausea due to scar tissue from a dog bite he incurred 6/27/17. 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 The dog reset the original bite so he incurred 2 bites from the dog. He reported significant tearing of the skin of his scalp that required surgery and 60 staples. He currently reports significant tenderness, hypersensitivity, pain, and numbness of the areas involving the bite. He is eager to improve his symptoms and reduce overall discomfort. Past Medical History/Comorbidities:   Mr. Shilo Back  has no past medical history on file. Mr. Shilo Back  has no past surgical history on file. Social History/Living Environment:    Patient lives at home with wife and reports no complaints with household chores/ADLs. Prior Level of Function/Work/Activity:  Independent without dysfunction. 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 He is very active and exercise at the gym lifting weights 3 times a week. Dominant Side:         RIGHT  Current Medications:       Current Outpatient Prescriptions:    fish oil and daily multivitamin   Date Last Reviewed:  7/16/2018   Number of Personal Factors/Comorbidities that affect the Plan of Care: 0: LOW COMPLEXITY   EXAMINATION:     Patient denies any increase of symptoms with coughing, sneezing or valsalva maneuver.  Patient denies any changes in vision, dizziness, vertigo, drop attacks, black outs, tinnitus, dysphagia, dysarthria, LE symptoms or bowel/bladder dysfunction. Observation/Orthostatic Postural Assessment: Patient sits with minimal (form moderate) forward head, forward shoulders, and thoracic kyphosis that is reversible with cuing. Superior portion of scalp reveals irregular scar across top of head that is completely approximated with scar tissue. No significant deformity of the skull/scalp is noted. Palpation: Gross tenderness to palpation and hypersensitivity of the scalp and scar tissue superiorly but this has improved since the start of therapy and pain/abnormal sensation is located in specific places. Significant restrictions are noted in all directions to entire scar but mostly to the right portions of the scalp with palpable divot noted. Flexibility mildly limited of upper trapezius and levator scapulae bilaterally. Suboccipitals limited bilaterally. Vertebral-Basilar Screen: Hautant's test is negative. Cranial extension test is negative. ROM:   Cervical extension (degrees): 70°   Cervical flexion: 60°   Cervical left side bend: 50°   Cervical right side bend: 40°   Cervical left rotation: 80°   Cervical right rotation: 70°     Strength: Grossly 5/5 for upper quarter. Joint Mobility:  Mild to moderate limitations noted of suboccipitals with posterior glide of occiput on C1. Mild limitations with bilateral C1 on C2 rotations with cervical spine locked into full flexion. Special Tests: Ligament stress tests performed through upper cervical spine for transverse ligament including Sharp-Pillo test is negative, and [place]-Axis shear test is negative. [place]-Axis side flexion test for integrity of alar ligament is negative. Spurling test is mildly positive on the right. Cervical distraction is for stretch and tension release of neck.  Neurovascular testing for thoracic outlet syndrome is negative. Neurological Screen:  Myotomes: Key muscle strength testing for bilateral UE is WNL. Dermatomes: Sensation testing through bilateral upper quadrants for light touch is WNL. Functional Mobility:  Patient is independent and safe with all activities. Reports biggest limitation is sensitivity to the touch of the top of the head. Body Structures Involved:  1. Joints  2. Muscles  3. Ligaments Body Functions Affected:  1. Sensory/Pain  2. Neuromusculoskeletal Activities and Participation Affected:  1. Community, Social and [place]   Number of elements (examined above) that affect the Plan of Care: 3: MODERATE COMPLEXITY   CLINICAL PRESENTATION:   Presentation: Stable and uncomplicated: LOW COMPLEXITY   CLINICAL DECISION MAKING:   Outcome Measure: Tool Used: Neck Disability Index (NDI)  Score:  Initial: 0/50  Most Recent: X/50 (Date: -- )   Interpretation of Score: The Neck Disability Index is a revised form of the Oswestry Low Back Pain Index and is designed to measure the activities of daily living in person's with neck pain. Each section is scored on a 0-5 scale, 5 representing the greatest disability. The scores of each section are added together for a total score of 50. Medical Necessity:   · Patient is expected to demonstrate progress in strength, range of motion, balance and coordination to improve tolerance to touching his head, performing self scar mobilization, washing hair, and wearing hats. · Skilled intervention continues to be required due to hypersensitivity, head pain, numbness, and nausea associated with pain. Reason for Services/Other Comments:  · Patient continues to require skilled intervention due to increasing complexity of exercises.    Use of outcome tool(s) and clinical judgement create a POC that gives a: Clear prediction of patient's progress: LOW COMPLEXITY            TREATMENT:   (In addition to Assessment/Re-Assessment sessions the following treatments were rendered)    Pre-treatment Symptoms/Complaints:  Patient reported improvements overall. Feels a little sore today but patient thinks it could be from dehydration. Pain: Initial:   Pain Intensity 1: 0  Pain Location 1: Head  Post Session: 0/10     Therapeutic Exercise: ( ):  Exercises per grid below to improve mobility. Required minimal visual, verbal and manual cues to promote proper body alignment and promote proper body breathing techniques. Progressed resistance, range, repetitions and complexity of movement as indicated. (used abbreviations: BET-back education training) Date:  6/13/2018 Date:   Date:     Activity/Exercise Parameters Parameters Parameters   Upper trapezius stretch 3 x 30 sec with hands     Levator scapulae stretch 3 x 30 sec with hands                                       Manual Therapy (    Soft Tissue Mobilization Duration  Duration: 55 Minutes): improve joint and soft tissue mobility  · Supine stretching of upper trapezius and levator scapulae on the right  · Supine traction of cervical spine and suboccipitals  · Supine deep tissue mobilization of suboccipitals on the right  · Supine deep tissue mobilization of the scalp scar tissue with cream and suction cup and gliding of the scars in all directions  (Used abbreviations: MET - muscle energy technique; PNF - proprioceptive neuromuscular facilitation; NMR - neuromuscular re-education; a/p - anterior to posterior; p/a - posterior to anterior; NT - not tested)    Therapeutic Modalities: for edema and pain                              Cervical Spine Cold  Type: Cold/ice pack  Duration : 10 minutes  Patient Position: Supine                                                                HEP: As above; handouts given to patient for all exercises. Treatment/Session Assessment:    · Response to Treatment:  Patient reported no new complaints. A little nauseated at the end of session. Otherwise tolerated session well.     · Compliance with Program/Exercises: compliant most of the time. · Recommendations/Intent for next treatment session: \"Next visit will focus on advancements to more challenging activities\".     Total Treatment Duration: 65 minutes  PT Patient Time In/Time Out  Time In: 2200  Time Out: Silvia Chauhan

## 2018-07-17 ENCOUNTER — APPOINTMENT (OUTPATIENT)
Dept: PHYSICAL THERAPY | Age: 28
End: 2018-07-17
Payer: COMMERCIAL

## 2018-07-19 ENCOUNTER — HOSPITAL ENCOUNTER (OUTPATIENT)
Dept: PHYSICAL THERAPY | Age: 28
Discharge: HOME | End: 2018-07-19
Payer: COMMERCIAL

## 2018-07-19 PROCEDURE — 97140 MANUAL THERAPY 1/> REGIONS: CPT

## 2018-07-19 NOTE — PROGRESS NOTES
Brien Stevens  : 1990  Primary: Lefty Bravoriswisam  Secondary:  [address] at [address], [place], [address] [place] [address]  [phone]   [phone]         OUTPATIENT PHYSICAL THERAPY:Daily Note 2018    ICD-10: Treatment Diagnosis: laceration without foreign body of other part of head, initial encounter (S01.81XA)  Treatment Diagnosis 2: Open bite of other part of head, initial encounter (S01.85XA)  Treatment Diagnosis 3: headache (R51)  Precautions: None  Allergies:   Review of patient's allergies indicates no known allergies. Fall Risk Score: 1 (? 5 = High Risk)  MD Orders: Evaluate and Treat MEDICAL/REFERRING DIAGNOSIS:  Laceration without foreign body of other part of head, initial encounter Rula Perez  Open bite of other part of head, initial encounter [S01.85XA]   DATE OF ONSET: 17  REFERRING PHYSICIAN: Verito Rubio [facility] Street: not scheduled     INITIAL ASSESSMENT:  Mr. Napoleon Mallory is a 32 y.o. male presenting with scalp and head tenderness, pain, numbness, and associated nausea due to scar tissue from a dog bite he incurred 17. 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 The dog reset the original bite so he incurred 2 bites from the dog. He reported significant tearing of the skin of his scalp that required surgery and 60 staples. He currently reports significant tenderness, hypersensitivity, pain, and numbness of the areas involving the bite. He is eager to improve his symptoms and reduce overall discomfort. Patient has been seen for 9 sessions of therapy with significant success. He feels about 60-70% better overall with nausea, ear fullness, and overall pain in the head. He has been performing self scar mobilizations regularly and reports improvements on his own.   Will continue therapy with emphasis on scar mobilization and mobility. He is a good candidate for continued skilled intervention with services to include manual therapy, modalities as needed, therapeutic exercises, postural re-education, and scar desensitization/mobilization. PROBLEM LIST (Impacting functional limitations):  1. Increased Pain  2. Hypersensitivity, numbness, and nausea INTERVENTIONS PLANNED:  1. Cold  2. Cryotherapy  3. Heat  4. Home Exercise Program (HEP)  5. Manual Therapy  6. Range of Motion (ROM)  7. Therapeutic Exercise/Strengthening  8. scar desensitization/mobilization   TREATMENT PLAN:  Effective Dates: 6/13/2018 TO 7/25/2018. Frequency/Duration: 2 times a week for 4-6 weeks  GOALS: (Goals have been discussed and agreed upon with patient.)  Short-Term Functional Goals: Time Frame: 6/13/2018 to 7/4/2018  1. Patient demonstrates independence with home exercise program without verbal cueing provided by therapist. - GOAL MET  2. Improve patient's tolerance to touching his head and performing wash rag desensitization for up to 5 minutes at least 1 time a day. - GOAL MET  3. Improve flexibility of bilateral upper trapezius and levator scapulae bilaterally in order to reduce suboccipital and cervical tightness. - GOAL MET  Discharge Goals: Time Frame: 6/13/2018 to 7/25/2018  1. Improve pain 2/10 at the most with wearing a hat, touching his head, and washing his hair. - ONGOING  2. Improve tolerance to scar mobilization in the clinic and with HEP for up to 10 minutes with minimal to no sensation of nausea and minimal pain. - ONGOING  3. Improve tenderness to palpation and spasm of bilateral suboccipitals and proximal cervical paraspinals in order to reduce head pain. - ONGOING  4. Reduce ear symptoms of fullness/opening and pain with incidence less than 1 time a day. - GOAL MET  5. Maintain Neck Disability Index score at 0/50 throughout rehab process.   - GOAL MET  Rehabilitation Potential For Stated Goals: Good  Regarding Tavares Floyd Christiano's therapy, I certify that the treatment plan above will be carried out by a therapist or under their direction. Thank you for this referral,  Connie Benedict, PT, DPT, OCS     Referring Physician Signature: Cal Reina*              Date                    The information in this section was collected on 7/12/2018 (except where otherwise noted). HISTORY:   History of Present Injury/Illness (Reason for Referral):  Mr. Manny Morris is a 32 y.o. male presenting with scalp and head tenderness, pain, numbness, and associated nausea due to scar tissue from a dog bite he incurred 6/27/17. 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 The dog reset the original bite so he incurred 2 bites from the dog. He reported significant tearing of the skin of his scalp that required surgery and 60 staples. He currently reports significant tenderness, hypersensitivity, pain, and numbness of the areas involving the bite. He is eager to improve his symptoms and reduce overall discomfort. Past Medical History/Comorbidities:   Mr. Manny Morris  has no past medical history on file. Mr. Manny Morris  has no past surgical history on file. Social History/Living Environment:    Patient lives at home with wife and reports no complaints with household chores/ADLs. Prior Level of Function/Work/Activity:  Independent without dysfunction. 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 He is very active and exercise at the gym lifting weights 3 times a week. Dominant Side:         RIGHT  Current Medications:       Current Outpatient Prescriptions:    fish oil and daily multivitamin   Date Last Reviewed:  7/19/2018   Number of Personal Factors/Comorbidities that affect the Plan of Care: 0: LOW COMPLEXITY   EXAMINATION:     Patient denies any increase of symptoms with coughing, sneezing or valsalva maneuver.  Patient denies any changes in vision, dizziness, vertigo, drop attacks, black outs, tinnitus, dysphagia, dysarthria, LE symptoms or bowel/bladder dysfunction. Observation/Orthostatic Postural Assessment: Patient sits with minimal (form moderate) forward head, forward shoulders, and thoracic kyphosis that is reversible with cuing. Superior portion of scalp reveals irregular scar across top of head that is completely approximated with scar tissue. No significant deformity of the skull/scalp is noted. Palpation: Gross tenderness to palpation and hypersensitivity of the scalp and scar tissue superiorly but this has improved since the start of therapy and pain/abnormal sensation is located in specific places. Significant restrictions are noted in all directions to entire scar but mostly to the right portions of the scalp with palpable divot noted. Flexibility mildly limited of upper trapezius and levator scapulae bilaterally. Suboccipitals limited bilaterally. Vertebral-Basilar Screen: Hautant's test is negative. Cranial extension test is negative. ROM:   Cervical extension (degrees): 70°   Cervical flexion: 60°   Cervical left side bend: 50°   Cervical right side bend: 40°   Cervical left rotation: 80°   Cervical right rotation: 70°     Strength: Grossly 5/5 for upper quarter. Joint Mobility:  Mild to moderate limitations noted of suboccipitals with posterior glide of occiput on C1. Mild limitations with bilateral C1 on C2 rotations with cervical spine locked into full flexion. Special Tests: Ligament stress tests performed through upper cervical spine for transverse ligament including Sharp-Pillo test is negative, and [place]-Axis shear test is negative. [place]-Axis side flexion test for integrity of alar ligament is negative. Spurling test is mildly positive on the right. Cervical distraction is for stretch and tension release of neck.  Neurovascular testing for thoracic outlet syndrome is negative. Neurological Screen:  Myotomes: Key muscle strength testing for bilateral UE is WNL. Dermatomes: Sensation testing through bilateral upper quadrants for light touch is WNL. Functional Mobility:  Patient is independent and safe with all activities. Reports biggest limitation is sensitivity to the touch of the top of the head. Body Structures Involved:  1. Joints  2. Muscles  3. Ligaments Body Functions Affected:  1. Sensory/Pain  2. Neuromusculoskeletal Activities and Participation Affected:  1. Community, Social and [place]   Number of elements (examined above) that affect the Plan of Care: 3: MODERATE COMPLEXITY   CLINICAL PRESENTATION:   Presentation: Stable and uncomplicated: LOW COMPLEXITY   CLINICAL DECISION MAKING:   Outcome Measure: Tool Used: Neck Disability Index (NDI)  Score:  Initial: 0/50  Most Recent: X/50 (Date: -- )   Interpretation of Score: The Neck Disability Index is a revised form of the Oswestry Low Back Pain Index and is designed to measure the activities of daily living in person's with neck pain. Each section is scored on a 0-5 scale, 5 representing the greatest disability. The scores of each section are added together for a total score of 50. Medical Necessity:   · Patient is expected to demonstrate progress in strength, range of motion, balance and coordination to improve tolerance to touching his head, performing self scar mobilization, washing hair, and wearing hats. · Skilled intervention continues to be required due to hypersensitivity, head pain, numbness, and nausea associated with pain. Reason for Services/Other Comments:  · Patient continues to require skilled intervention due to increasing complexity of exercises.    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 Otherwise no complaints. Pain: Initial:   Pain Intensity 1: 0  Pain Location 1: Head  Post Session: 0/10     Therapeutic Exercise: ( ):  Exercises per grid below to improve mobility. Required minimal visual, verbal and manual cues to promote proper body alignment and promote proper body breathing techniques. Progressed resistance, range, repetitions and complexity of movement as indicated. (used abbreviations: BET-back education training) Date:  6/13/2018 Date:   Date:     Activity/Exercise Parameters Parameters Parameters   Upper trapezius stretch 3 x 30 sec with hands     Levator scapulae stretch 3 x 30 sec with hands                                       Manual Therapy (    Soft Tissue Mobilization Duration  Duration: 55 Minutes): improve joint and soft tissue mobility  · Supine stretching of upper trapezius and levator scapulae on the right  · Supine traction of cervical spine and suboccipitals  · Supine deep tissue mobilization of suboccipitals on the right  · Supine deep tissue mobilization of the scalp scar tissue with cream and suction cup and gliding of the scars in all directions  (Used abbreviations: MET - muscle energy technique; PNF - proprioceptive neuromuscular facilitation; NMR - neuromuscular re-education; a/p - anterior to posterior; p/a - posterior to anterior; NT - not tested)    Therapeutic Modalities: for edema and pain                                                                                               HEP: As above; handouts given to patient for all exercises. Treatment/Session Assessment:    · Compliance with Program/Exercises: compliant most of the time. No nausea with session. · Recommendations/Intent for next treatment session: \"Next visit will focus on advancements to more challenging activities\".     Total Treatment Duration: 65 minutes  PT Patient Time In/Time Out  Time In: 7706  Time Out: [address] [place], [place]

## 2018-07-23 ENCOUNTER — HOSPITAL ENCOUNTER (OUTPATIENT)
Dept: PHYSICAL THERAPY | Age: 28
Discharge: HOME | End: 2018-07-23
Payer: COMMERCIAL

## 2018-07-23 PROCEDURE — 97140 MANUAL THERAPY 1/> REGIONS: CPT

## 2018-07-23 NOTE — THERAPY RECERTIFICATION
Chapis aSlvador  : 1990  Primary: Ilana Pedro Medriswisam  Secondary:  [address] at [address], [address]  [phone]   [phone]         OUTPATIENT PHYSICAL THERAPY:Daily Note and Recertification     ICD-10: Treatment Diagnosis: laceration without foreign body of other part of head, initial encounter (S01.81XA)  Treatment Diagnosis 2: Open bite of other part of head, initial encounter (S01.85XA)  Treatment Diagnosis 3: headache (R51)  Precautions: None  Allergies:   Review of patient's allergies indicates no known allergies. Fall Risk Score: 1 (? 5 = High Risk)  MD Orders: Evaluate and Treat MEDICAL/REFERRING DIAGNOSIS:  Laceration without foreign body of other part of head, initial encounter Noreene Plenty  Open bite of other part of head, initial encounter [S01.85XA]   DATE OF ONSET: 17  REFERRING PHYSICIAN: Samir Bautista MD  RETURN PHYSICIAN APPOINTMENT: not scheduled     INITIAL ASSESSMENT:  Mr. Charu Martinez is a 32 y.o. male presenting with scalp and head tenderness, pain, numbness, and associated nausea due to scar tissue from a dog bite he incurred 17. 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 The dog reset the original bite so he incurred 2 bites from the dog. He reported significant tearing of the skin of his scalp that required surgery and 60 staples. He currently reports significant tenderness, hypersensitivity, pain, and numbness of the areas involving the bite. He is eager to improve his symptoms and reduce overall discomfort. Patient has been seen for 12 sessions of therapy with significant success from 2018 to 2018 with significant success. He feels about 60-70% better overall with nausea, ear fullness, and overall pain in the head.   He has been performing self scar mobilizations regularly and reports improvements on his own. Will continue therapy with emphasis on scar mobilization and mobility 1 time a week until soreness of the scar improves, nausea and fullness of the ear. He is a good candidate for continued skilled intervention with services to include manual therapy, modalities as needed, therapeutic exercises, postural re-education, and scar desensitization/mobilization. PROBLEM LIST (Impacting functional limitations):  1. Increased Pain  2. Hypersensitivity, numbness, and nausea INTERVENTIONS PLANNED:  1. Cold  2. Cryotherapy  3. Heat  4. Home Exercise Program (HEP)  5. Manual Therapy  6. Range of Motion (ROM)  7. Therapeutic Exercise/Strengthening  8. scar desensitization/mobilization   TREATMENT PLAN:  Effective Dates: 7/23/2018 TO 9/17/2018. Frequency/Duration: RE-CERTIFICATION: 1 times a week for 6 weeks  GOALS: (Goals have been discussed and agreed upon with patient.)  Short-Term Functional Goals: Time Frame: 6/13/2018 to 7/4/2018  1. Patient demonstrates independence with home exercise program without verbal cueing provided by therapist. - GOAL MET  2. Improve patient's tolerance to touching his head and performing wash rag desensitization for up to 5 minutes at least 1 time a day. - GOAL MET  3. Improve flexibility of bilateral upper trapezius and levator scapulae bilaterally in order to reduce suboccipital and cervical tightness. - GOAL MET  Discharge Goals: Time Frame: 7/23/2018 to 9/17/2018  1. Improve pain 2/10 at the most with wearing a hat, touching his head, and washing his hair. - ONGOING  2. Improve tolerance to scar mobilization in the clinic and with HEP for up to 10 minutes with minimal to no sensation of nausea and minimal pain. - ONGOING  3. Improve tenderness to palpation and spasm of bilateral suboccipitals and proximal cervical paraspinals in order to reduce head pain. - ONGOING  4.  Reduce ear symptoms of fullness/opening and pain with incidence less than 1 time a day. - GOAL MET  5. Maintain Neck Disability Index score at 0/50 throughout rehab process. - GOAL MET  Rehabilitation Potential For Stated Goals: Good  Regarding Dedonna Kessler therapy, I certify that the treatment plan above will be carried out by a therapist or under their direction. Thank you for this referral,  Sharyn Pryor, PT, DPT, OCS     Referring Physician Signature: Ronak Alba MD _________________________________ Date: ________              The information in this section was collected on 7/23/2018 (except where otherwise noted). HISTORY:   History of Present Injury/Illness (Reason for Referral):  Mr. Ariel Richards is a 32 y.o. male presenting with scalp and head tenderness, pain, numbness, and associated nausea due to scar tissue from a dog bite he incurred 6/27/17. He reported he was training a [address] [employer] as a part of his job as a [employer]  and the dog accidentally bit him on the top of the head. The dog reset the original bite so he incurred 2 bites from the dog. He reported significant tearing of the skin of his scalp that required surgery and 60 staples. He currently reports significant tenderness, hypersensitivity, pain, and numbness of the areas involving the bite. He is eager to improve his symptoms and reduce overall discomfort. Past Medical History/Comorbidities:   Mr. Ariel Richards  has no past medical history on file. Mr. Ariel Richards  has no past surgical history on file. Social History/Living Environment:    Patient lives at home with wife and reports no complaints with household chores/ADLs. Prior Level of Function/Work/Activity:  Independent without dysfunction. 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 He is very active and exercise at the gym lifting weights 3 times a week.   Dominant Side:         RIGHT  Current Medications:       Current Outpatient Prescriptions:    fish oil and daily multivitamin   Date Last Reviewed:  7/23/2018   Number of Personal Factors/Comorbidities that affect the Plan of Care: 0: LOW COMPLEXITY   EXAMINATION:     Patient denies any increase of symptoms with coughing, sneezing or valsalva maneuver. Patient denies any changes in vision, dizziness, vertigo, drop attacks, black outs, tinnitus, dysphagia, dysarthria, LE symptoms or bowel/bladder dysfunction. Observation/Orthostatic Postural Assessment: Patient sits with minimal (form moderate) forward head, forward shoulders, and thoracic kyphosis that is reversible with cuing. Superior portion of scalp reveals irregular scar across top of head that is completely approximated with scar tissue. No significant deformity of the skull/scalp is noted. Palpation: Gross tenderness to palpation and hypersensitivity of the scalp and scar tissue superiorly but this has improved since the start of therapy and pain/abnormal sensation is located in specific places. Significant restrictions are noted in all directions to entire scar but mostly to the right portions of the scalp with palpable and visible divot noted. Flexibility mildly limited of upper trapezius and levator scapulae bilaterally. Suboccipitals limited bilaterally. Vertebral-Basilar Screen: Hautant's test is negative. Cranial extension test is negative. ROM:   Cervical extension (degrees): 70°   Cervical flexion: 60°   Cervical left side bend: 50°   Cervical right side bend: 40°   Cervical left rotation: 80°   Cervical right rotation: 70°     Strength: Grossly 5/5 for upper quarter. Joint Mobility:  Mild to moderate limitations noted of suboccipitals with posterior glide of occiput on C1. Mild limitations with bilateral C1 on C2 rotations with cervical spine locked into full flexion.     Special Tests: Ligament stress tests performed through upper cervical spine for transverse ligament including Sharp-Pillo test is negative, and [place]-Axis shear test is negative. [place]-Axis side flexion test for integrity of alar ligament is negative. Spurling test is mildly positive on the right. Cervical distraction is for stretch and tension release of neck. Neurovascular testing for thoracic outlet syndrome is negative. Neurological Screen:  Myotomes: Key muscle strength testing for bilateral UE is WNL. Dermatomes: Sensation testing through bilateral upper quadrants for light touch is WNL. Functional Mobility:  Patient is independent and safe with all activities. Reports biggest limitation is sensitivity to the touch of the top of the head. Body Structures Involved:  1. Joints  2. Muscles  3. Ligaments Body Functions Affected:  1. Sensory/Pain  2. Neuromusculoskeletal Activities and Participation Affected:  1. Community, Social and [place]   Number of elements (examined above) that affect the Plan of Care: 3: MODERATE COMPLEXITY   CLINICAL PRESENTATION:   Presentation: Stable and uncomplicated: LOW COMPLEXITY   CLINICAL DECISION MAKING:   Outcome Measure: Tool Used: Neck Disability Index (NDI)  Score:  Initial: 0/50  Most Recent: 0-1/50 (Date: 7/23/2018)   Interpretation of Score: The Neck Disability Index is a revised form of the Oswestry Low Back Pain Index and is designed to measure the activities of daily living in person's with neck pain. Each section is scored on a 0-5 scale, 5 representing the greatest disability. The scores of each section are added together for a total score of 50. Medical Necessity:   · Patient is expected to demonstrate progress in strength, range of motion, balance and coordination to improve tolerance to touching his head, performing self scar mobilization, washing hair, and wearing hats.   · Skilled intervention continues to be required due to hypersensitivity, head pain, numbness, and nausea associated with pain.  Reason for Services/Other Comments:  · Patient continues to require skilled intervention due to increasing complexity of exercises. Use of outcome tool(s) and clinical judgement create a POC that gives a: Clear prediction of patient's progress: LOW COMPLEXITY            TREATMENT:   (In addition to Assessment/Re-Assessment sessions the following treatments were rendered)    Pre-treatment Symptoms/Complaints:  Patient reported continued divot in the right portion of his scalp but otherwise improvements noted overall. He would like to continue therapy in order to improve tenderness, nausea and ear fullness. Pain: Initial:   Pain Intensity 1: 0  Pain Location 1: Head  Post Session: 0/10     Therapeutic Exercise: ( ):  Exercises per grid below to improve mobility. Required minimal visual, verbal and manual cues to promote proper body alignment and promote proper body breathing techniques. Progressed resistance, range, repetitions and complexity of movement as indicated.   (used abbreviations: BET-back education training) Date:  6/13/2018 Date:   Date:     Activity/Exercise Parameters Parameters Parameters   Upper trapezius stretch 3 x 30 sec with hands     Levator scapulae stretch 3 x 30 sec with hands                                       Manual Therapy (    Soft Tissue Mobilization Duration  Duration: 55 Minutes): improve joint and soft tissue mobility  · Supine stretching of upper trapezius and levator scapulae on the right  · Supine traction of cervical spine and suboccipitals  · Supine deep tissue mobilization of suboccipitals on the right  · Supine deep tissue mobilization of the scalp scar tissue with cream and suction cup and gliding of the scars in all directions  (Used abbreviations: MET - muscle energy technique; PNF - proprioceptive neuromuscular facilitation; NMR - neuromuscular re-education; a/p - anterior to posterior; p/a - posterior to anterior; NT - not tested)    Therapeutic Modalities: for edema and pain                                                                                               HEP: As above; handouts given to patient for all exercises. Treatment/Session Assessment:    · Response to Treatment:  Patient tolerated session well and reported no complaints. Would like to continue therapy 1 time a week in order to improve deficits. · Compliance with Program/Exercises: compliant most of the time. · Recommendations/Intent for next treatment session: \"Next visit will focus on advancements to more challenging activities\". RE-CERTIFICATION: 1 TIME A WEEK FOR 6 WEEKS FROM 7/23/2018 TO 9/17/2018 IN ORDER TO MEET ABOVE SET GOALS.     Total Treatment Duration: 65 minutes  PT Patient Time In/Time Out  Time In: 9490  Time Out: Ronit Chauhan

## 2018-08-07 NOTE — PROGRESS NOTES
I am accessing Mr. Dixie Solares chart as a part of our department's internal chart auditing process. I certify that Mr. Ismael Head is, or was, a patient in our department.   Thank you,  Leann Lindo, PT  8/7/2018

## 2018-08-10 ENCOUNTER — HOSPITAL ENCOUNTER (OUTPATIENT)
Dept: PHYSICAL THERAPY | Age: 28
End: 2018-08-10

## 2018-08-10 ENCOUNTER — HOSPITAL ENCOUNTER (OUTPATIENT)
Dept: PHYSICAL THERAPY | Age: 28
Discharge: HOME | End: 2018-08-10
Payer: COMMERCIAL

## 2018-08-10 PROCEDURE — 97140 MANUAL THERAPY 1/> REGIONS: CPT

## 2018-08-10 NOTE — PROGRESS NOTES
Miguel Carrera  : 1990  Primary: Camden Hernandezus Mary  Secondary:  [address] at [address], [address] [place] [address] Street  [phone]   [phone]         OUTPATIENT PHYSICAL THERAPY:Daily Note 8/10/2018    ICD-10: Treatment Diagnosis: laceration without foreign body of other part of head, initial encounter (S01.81XA)  Treatment Diagnosis 2: Open bite of other part of head, initial encounter (S01.85XA)  Treatment Diagnosis 3: headache (R51)  Precautions: None  Allergies:   Review of patient's allergies indicates no known allergies. Fall Risk Score: 1 (? 5 = High Risk)  MD Orders: Evaluate and Treat MEDICAL/REFERRING DIAGNOSIS:  Laceration without foreign body of other part of head, initial encounter Gracy Rico  Open bite of other part of head, initial encounter [S01.85XA]   DATE OF ONSET: 17  REFERRING PHYSICIAN: Evelyn Shoemaker MD  RETURN PHYSICIAN APPOINTMENT: not scheduled     INITIAL ASSESSMENT:  Mr. Priyank Morejon is a 32 y.o. male presenting with scalp and head tenderness, pain, numbness, and associated nausea due to scar tissue from a dog bite he incurred 17. 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 The dog reset the original bite so he incurred 2 bites from the dog. He reported significant tearing of the skin of his scalp that required surgery and 60 staples. He currently reports significant tenderness, hypersensitivity, pain, and numbness of the areas involving the bite. He is eager to improve his symptoms and reduce overall discomfort. Patient has been seen for 12 sessions of therapy with significant success from 2018 to 2018 with significant success. He feels about 60-70% better overall with nausea, ear fullness, and overall pain in the head.   He has been performing self scar mobilizations regularly and reports improvements on his own. Will continue therapy with emphasis on scar mobilization and mobility 1 time a week until soreness of the scar improves, nausea and fullness of the ear. He is a good candidate for continued skilled intervention with services to include manual therapy, modalities as needed, therapeutic exercises, postural re-education, and scar desensitization/mobilization. PROBLEM LIST (Impacting functional limitations):  1. Increased Pain  2. Hypersensitivity, numbness, and nausea INTERVENTIONS PLANNED:  1. Cold  2. Cryotherapy  3. Heat  4. Home Exercise Program (HEP)  5. Manual Therapy  6. Range of Motion (ROM)  7. Therapeutic Exercise/Strengthening  8. scar desensitization/mobilization   TREATMENT PLAN:  Effective Dates: 7/23/2018 TO 9/17/2018. Frequency/Duration: RE-CERTIFICATION: 1 times a week for 6 weeks  GOALS: (Goals have been discussed and agreed upon with patient.)  Short-Term Functional Goals: Time Frame: 6/13/2018 to 7/4/2018  1. Patient demonstrates independence with home exercise program without verbal cueing provided by therapist. - GOAL MET  2. Improve patient's tolerance to touching his head and performing wash rag desensitization for up to 5 minutes at least 1 time a day. - GOAL MET  3. Improve flexibility of bilateral upper trapezius and levator scapulae bilaterally in order to reduce suboccipital and cervical tightness. - GOAL MET  Discharge Goals: Time Frame: 7/23/2018 to 9/17/2018  1. Improve pain 2/10 at the most with wearing a hat, touching his head, and washing his hair. - ONGOING  2. Improve tolerance to scar mobilization in the clinic and with HEP for up to 10 minutes with minimal to no sensation of nausea and minimal pain. - ONGOING  3. Improve tenderness to palpation and spasm of bilateral suboccipitals and proximal cervical paraspinals in order to reduce head pain. - ONGOING  4.  Reduce ear symptoms of fullness/opening and pain with incidence less than 1 time a day. - GOAL MET  5. Maintain Neck Disability Index score at 0/50 throughout rehab process. - GOAL MET  Rehabilitation Potential For Stated Goals: Good  Regarding Swetha Horta therapy, I certify that the treatment plan above will be carried out by a therapist or under their direction. Thank you for this referral,  Danika Lloyd, PT, DPT, OCS     Referring Physician Signature: Nesha Cui MD _________________________________ Date: ________              The information in this section was collected on 7/23/2018 (except where otherwise noted). HISTORY:   History of Present Injury/Illness (Reason for Referral):  Mr. Ester Aparicio is a 32 y.o. male presenting with scalp and head tenderness, pain, numbness, and associated nausea due to scar tissue from a dog bite he incurred 6/27/17. He reported he was training a [address] [employer] as a part of his job as a [employer]  and the dog accidentally bit him on the top of the head. The dog reset the original bite so he incurred 2 bites from the dog. He reported significant tearing of the skin of his scalp that required surgery and 60 staples. He currently reports significant tenderness, hypersensitivity, pain, and numbness of the areas involving the bite. He is eager to improve his symptoms and reduce overall discomfort. Past Medical History/Comorbidities:   Mr. Ester Aparicio  has no past medical history on file. Mr. Ester Aparicio  has no past surgical history on file. Social History/Living Environment:    Patient lives at home with wife and reports no complaints with household chores/ADLs. Prior Level of Function/Work/Activity:  Independent without dysfunction. 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 He is very active and exercise at the gym lifting weights 3 times a week.   Dominant Side:         RIGHT  Current Medications:       Current Outpatient Prescriptions:    fish oil and daily multivitamin   Date Last Reviewed:  8/10/2018   Number of Personal Factors/Comorbidities that affect the Plan of Care: 0: LOW COMPLEXITY   EXAMINATION:     Patient denies any increase of symptoms with coughing, sneezing or valsalva maneuver. Patient denies any changes in vision, dizziness, vertigo, drop attacks, black outs, tinnitus, dysphagia, dysarthria, LE symptoms or bowel/bladder dysfunction. Observation/Orthostatic Postural Assessment: Patient sits with minimal (form moderate) forward head, forward shoulders, and thoracic kyphosis that is reversible with cuing. Superior portion of scalp reveals irregular scar across top of head that is completely approximated with scar tissue. No significant deformity of the skull/scalp is noted. Palpation: Gross tenderness to palpation and hypersensitivity of the scalp and scar tissue superiorly but this has improved since the start of therapy and pain/abnormal sensation is located in specific places. Significant restrictions are noted in all directions to entire scar but mostly to the right portions of the scalp with palpable and visible divot noted. Flexibility mildly limited of upper trapezius and levator scapulae bilaterally. Suboccipitals limited bilaterally. Vertebral-Basilar Screen: Hautant's test is negative. Cranial extension test is negative. ROM:   Cervical extension (degrees): 70°   Cervical flexion: 60°   Cervical left side bend: 50°   Cervical right side bend: 40°   Cervical left rotation: 80°   Cervical right rotation: 70°     Strength: Grossly 5/5 for upper quarter. Joint Mobility:  Mild to moderate limitations noted of suboccipitals with posterior glide of occiput on C1. Mild limitations with bilateral C1 on C2 rotations with cervical spine locked into full flexion.     Special Tests: Ligament stress tests performed through upper cervical spine for transverse ligament including Sharp-Pillo test is negative, and Friedheim-Axis shear test is negative. Friedheim-Axis side flexion test for integrity of alar ligament is negative. Spurling test is mildly positive on the right. Cervical distraction is for stretch and tension release of neck. Neurovascular testing for thoracic outlet syndrome is negative. Neurological Screen:  Myotomes: Key muscle strength testing for bilateral UE is WNL. Dermatomes: Sensation testing through bilateral upper quadrants for light touch is WNL. Functional Mobility:  Patient is independent and safe with all activities. Reports biggest limitation is sensitivity to the touch of the top of the head. Body Structures Involved:  1. Joints  2. Muscles  3. Ligaments Body Functions Affected:  1. Sensory/Pain  2. Neuromusculoskeletal Activities and Participation Affected:  1. Community, Social and [place]   Number of elements (examined above) that affect the Plan of Care: 3: MODERATE COMPLEXITY   CLINICAL PRESENTATION:   Presentation: Stable and uncomplicated: LOW COMPLEXITY   CLINICAL DECISION MAKING:   Outcome Measure: Tool Used: Neck Disability Index (NDI)  Score:  Initial: 0/50  Most Recent: 0-1/50 (Date: 7/23/2018)   Interpretation of Score: The Neck Disability Index is a revised form of the Oswestry Low Back Pain Index and is designed to measure the activities of daily living in person's with neck pain. Each section is scored on a 0-5 scale, 5 representing the greatest disability. The scores of each section are added together for a total score of 50. Medical Necessity:   · Patient is expected to demonstrate progress in strength, range of motion, balance and coordination to improve tolerance to touching his head, performing self scar mobilization, washing hair, and wearing hats. · Skilled intervention continues to be required due to hypersensitivity, head pain, numbness, and nausea associated with pain.   Reason for Services/Other Comments:  · Patient continues to require skilled intervention due to increasing complexity of exercises. Use of outcome tool(s) and clinical judgement create a POC that gives a: Clear prediction of patient's progress: LOW COMPLEXITY            TREATMENT:   (In addition to Assessment/Re-Assessment sessions the following treatments were rendered)    Pre-treatment Symptoms/Complaints:  Patient reported improvements overall but the top of his head has been creating some strange throat sensations and ear fullness on the right. Pain: Initial:   Pain Intensity 1: 0  Pain Location 1: Head  Post Session: 0/10     Therapeutic Exercise: ( ):  Exercises per grid below to improve mobility. Required minimal visual, verbal and manual cues to promote proper body alignment and promote proper body breathing techniques. Progressed resistance, range, repetitions and complexity of movement as indicated.   (used abbreviations: BET-back education training) Date:  6/13/2018 Date:   Date:     Activity/Exercise Parameters Parameters Parameters   Upper trapezius stretch 3 x 30 sec with hands     Levator scapulae stretch 3 x 30 sec with hands                                       Manual Therapy (    Soft Tissue Mobilization Duration  Duration: 55 Minutes): improve joint and soft tissue mobility  · Supine stretching of upper trapezius and levator scapulae on the right  · Supine traction of cervical spine and suboccipitals  · Supine deep tissue mobilization of suboccipitals on the right  · Supine deep tissue mobilization of the scalp scar tissue with cream and suction cup and gliding of the scars in all directions  (Used abbreviations: MET - muscle energy technique; PNF - proprioceptive neuromuscular facilitation; NMR - neuromuscular re-education; a/p - anterior to posterior; p/a - posterior to anterior; NT - not tested)    Therapeutic Modalities: for edema and pain                              Cervical Spine Cold  Type: Cold/ice pack  Duration : 10 minutes  Patient Position: Supine                                                                HEP: As above; handouts given to patient for all exercises. Treatment/Session Assessment:    · Response to Treatment:  Patient tolerated session well and reported some increased sensitivity today with manual therapy. Will continue to progress as tolerated. · Compliance with Program/Exercises: compliant most of the time. · Recommendations/Intent for next treatment session: \"Next visit will focus on advancements to more challenging activities\". RE-CERTIFICATION: 1 TIME A WEEK FOR 6 WEEKS FROM 7/23/2018 TO 9/17/2018 IN ORDER TO MEET ABOVE SET GOALS.     Total Treatment Duration: 65 minutes  PT Patient Time In/Time Out  Time In: 1330  Time Out: [facility]

## 2018-08-16 ENCOUNTER — APPOINTMENT (OUTPATIENT)
Dept: PHYSICAL THERAPY | Age: 28
End: 2018-08-16

## 2018-08-16 ENCOUNTER — HOSPITAL ENCOUNTER (OUTPATIENT)
Dept: PHYSICAL THERAPY | Age: 28
Discharge: HOME | End: 2018-08-16
Payer: COMMERCIAL

## 2018-08-22 ENCOUNTER — HOSPITAL ENCOUNTER (OUTPATIENT)
Dept: PHYSICAL THERAPY | Age: 28
End: 2018-08-22
Payer: COMMERCIAL

## 2018-08-22 ENCOUNTER — APPOINTMENT (OUTPATIENT)
Dept: PHYSICAL THERAPY | Age: 28
End: 2018-08-22

## 2018-08-23 ENCOUNTER — HOSPITAL ENCOUNTER (OUTPATIENT)
Dept: PHYSICAL THERAPY | Age: 28
Discharge: HOME | End: 2018-08-23
Payer: COMMERCIAL

## 2018-08-23 ENCOUNTER — APPOINTMENT (OUTPATIENT)
Dept: PHYSICAL THERAPY | Age: 28
End: 2018-08-23

## 2018-08-23 PROCEDURE — 97140 MANUAL THERAPY 1/> REGIONS: CPT

## 2018-08-23 NOTE — PROGRESS NOTES
Franco Tijerina  : 1990  Primary: [facility]  Secondary:  [address] at [address], [place], [address] [place] [address]  [phone]   [phone]         OUTPATIENT PHYSICAL THERAPY:Daily Note 2018    ICD-10: Treatment Diagnosis: laceration without foreign body of other part of head, initial encounter (S01.81XA)  Treatment Diagnosis 2: Open bite of other part of head, initial encounter (S01.85XA)  Treatment Diagnosis 3: headache (R51)  Precautions: None  Allergies:   Review of patient's allergies indicates no known allergies. Fall Risk Score: 1 (? 5 = High Risk)  MD Orders: Evaluate and Treat MEDICAL/REFERRING DIAGNOSIS:  Laceration without foreign body of other part of head, initial encounter Dorsie Curet  Open bite of other part of head, initial encounter [S01.85XA]   DATE OF ONSET: 17  REFERRING PHYSICIAN: Idalia Valero MD  RETURN PHYSICIAN APPOINTMENT: not scheduled     INITIAL ASSESSMENT:  Mr. Christophe Salvador is a 32 y.o. male presenting with scalp and head tenderness, pain, numbness, and associated nausea due to scar tissue from a dog bite he incurred 17. 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 The dog reset the original bite so he incurred 2 bites from the dog. He reported significant tearing of the skin of his scalp that required surgery and 60 staples. He currently reports significant tenderness, hypersensitivity, pain, and numbness of the areas involving the bite. He is eager to improve his symptoms and reduce overall discomfort. Patient has been seen for 12 sessions of therapy with significant success from 2018 to 2018 with significant success. He feels about 60-70% better overall with nausea, ear fullness, and overall pain in the head.   He has been performing self scar mobilizations regularly and reports improvements on his own. Will continue therapy with emphasis on scar mobilization and mobility 1 time a week until soreness of the scar improves, nausea and fullness of the ear. He is a good candidate for continued skilled intervention with services to include manual therapy, modalities as needed, therapeutic exercises, postural re-education, and scar desensitization/mobilization. PROBLEM LIST (Impacting functional limitations):  1. Increased Pain  2. Hypersensitivity, numbness, and nausea INTERVENTIONS PLANNED:  1. Cold  2. Cryotherapy  3. Heat  4. Home Exercise Program (HEP)  5. Manual Therapy  6. Range of Motion (ROM)  7. Therapeutic Exercise/Strengthening  8. scar desensitization/mobilization   TREATMENT PLAN:  Effective Dates: 7/23/2018 TO 9/17/2018. Frequency/Duration: RE-CERTIFICATION: 1 times a week for 6 weeks  GOALS: (Goals have been discussed and agreed upon with patient.)  Short-Term Functional Goals: Time Frame: 6/13/2018 to 7/4/2018  1. Patient demonstrates independence with home exercise program without verbal cueing provided by therapist. - GOAL MET  2. Improve patient's tolerance to touching his head and performing wash rag desensitization for up to 5 minutes at least 1 time a day. - GOAL MET  3. Improve flexibility of bilateral upper trapezius and levator scapulae bilaterally in order to reduce suboccipital and cervical tightness. - GOAL MET  Discharge Goals: Time Frame: 7/23/2018 to 9/17/2018  1. Improve pain 2/10 at the most with wearing a hat, touching his head, and washing his hair. - ONGOING  2. Improve tolerance to scar mobilization in the clinic and with HEP for up to 10 minutes with minimal to no sensation of nausea and minimal pain. - ONGOING  3. Improve tenderness to palpation and spasm of bilateral suboccipitals and proximal cervical paraspinals in order to reduce head pain. - ONGOING  4.  Reduce ear symptoms of fullness/opening and pain with incidence less than 1 time a day. - GOAL MET  5. Maintain Neck Disability Index score at 0/50 throughout rehab process. - GOAL MET  Rehabilitation Potential For Stated Goals: Good  Regarding Jeet Kindra therapy, I certify that the treatment plan above will be carried out by a therapist or under their direction. Thank you for this referral,  Rafaela Marsh, PT, DPT, OCS     Referring Physician Signature: Shaggy Lin MD _________________________________ Date: ________              The information in this section was collected on 7/23/2018 (except where otherwise noted). HISTORY:   History of Present Injury/Illness (Reason for Referral):  Mr. Edgar Moon is a 32 y.o. male presenting with scalp and head tenderness, pain, numbness, and associated nausea due to scar tissue from a dog bite he incurred 6/27/17. He reported he was training a [address] as a part of his job as a [employer]  and the dog accidentally bit him on the top of the head. The dog reset the original bite so he incurred 2 bites from the dog. He reported significant tearing of the skin of his scalp that required surgery and 60 staples. He currently reports significant tenderness, hypersensitivity, pain, and numbness of the areas involving the bite. He is eager to improve his symptoms and reduce overall discomfort. Past Medical History/Comorbidities:   Mr. Edgar Moon  has no past medical history on file. Mr. Edgar Moon  has no past surgical history on file. Social History/Living Environment:    Patient lives at home with wife and reports no complaints with household chores/ADLs. Prior Level of Function/Work/Activity:  Independent without dysfunction. 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 He is very active and exercise at the gym lifting weights 3 times a week.   Dominant Side:         RIGHT  Current Medications:       Current Outpatient Prescriptions:    fish oil and daily multivitamin   Date Last Reviewed:  8/23/2018   Number of Personal Factors/Comorbidities that affect the Plan of Care: 0: LOW COMPLEXITY   EXAMINATION:     Patient denies any increase of symptoms with coughing, sneezing or valsalva maneuver. Patient denies any changes in vision, dizziness, vertigo, drop attacks, black outs, tinnitus, dysphagia, dysarthria, LE symptoms or bowel/bladder dysfunction. Observation/Orthostatic Postural Assessment: Patient sits with minimal (form moderate) forward head, forward shoulders, and thoracic kyphosis that is reversible with cuing. Superior portion of scalp reveals irregular scar across top of head that is completely approximated with scar tissue. No significant deformity of the skull/scalp is noted. Palpation: Gross tenderness to palpation and hypersensitivity of the scalp and scar tissue superiorly but this has improved since the start of therapy and pain/abnormal sensation is located in specific places. Significant restrictions are noted in all directions to entire scar but mostly to the right portions of the scalp with palpable and visible divot noted. Flexibility mildly limited of upper trapezius and levator scapulae bilaterally. Suboccipitals limited bilaterally. Vertebral-Basilar Screen: Hautant's test is negative. Cranial extension test is negative. ROM:   Cervical extension (degrees): 70°   Cervical flexion: 60°   Cervical left side bend: 50°   Cervical right side bend: 40°   Cervical left rotation: 80°   Cervical right rotation: 70°     Strength: Grossly 5/5 for upper quarter. Joint Mobility:  Mild to moderate limitations noted of suboccipitals with posterior glide of occiput on C1. Mild limitations with bilateral C1 on C2 rotations with cervical spine locked into full flexion.     Special Tests: Ligament stress tests performed through upper cervical spine for transverse ligament including Sharp-Pillo test is negative, and Kimballton-Axis shear test is negative. Kimballton-Axis side flexion test for integrity of alar ligament is negative. Spurling test is mildly positive on the right. Cervical distraction is for stretch and tension release of neck. Neurovascular testing for thoracic outlet syndrome is negative. Neurological Screen:  Myotomes: Key muscle strength testing for bilateral UE is WNL. Dermatomes: Sensation testing through bilateral upper quadrants for light touch is WNL. Functional Mobility:  Patient is independent and safe with all activities. Reports biggest limitation is sensitivity to the touch of the top of the head. Body Structures Involved:  1. Joints  2. Muscles  3. Ligaments Body Functions Affected:  1. Sensory/Pain  2. Neuromusculoskeletal Activities and Participation Affected:  1. Community, Social and [place]   Number of elements (examined above) that affect the Plan of Care: 3: MODERATE COMPLEXITY   CLINICAL PRESENTATION:   Presentation: Stable and uncomplicated: LOW COMPLEXITY   CLINICAL DECISION MAKING:   Outcome Measure: Tool Used: Neck Disability Index (NDI)  Score:  Initial: 0/50  Most Recent: 0-1/50 (Date: 7/23/2018)   Interpretation of Score: The Neck Disability Index is a revised form of the Oswestry Low Back Pain Index and is designed to measure the activities of daily living in person's with neck pain. Each section is scored on a 0-5 scale, 5 representing the greatest disability. The scores of each section are added together for a total score of 50. Medical Necessity:   · Patient is expected to demonstrate progress in strength, range of motion, balance and coordination to improve tolerance to touching his head, performing self scar mobilization, washing hair, and wearing hats. · Skilled intervention continues to be required due to hypersensitivity, head pain, numbness, and nausea associated with pain.   Reason for Services/Other Comments:  · Patient continues to require skilled intervention due to increasing complexity of exercises. Use of outcome tool(s) and clinical judgement create a POC that gives a: Clear prediction of patient's progress: LOW COMPLEXITY            TREATMENT:   (In addition to Assessment/Re-Assessment sessions the following treatments were rendered)    Pre-treatment Symptoms/Complaints:  Patient reported less pain since starting therapy but break in therapy frequency recently has affected his symptoms. Has been having more sharp pain in his incisions. Pain: Initial:   Pain Intensity 1: 0  Pain Location 1: Head  Post Session: 0/10     Therapeutic Exercise: ( ):  Exercises per grid below to improve mobility. Required minimal visual, verbal and manual cues to promote proper body alignment and promote proper body breathing techniques. Progressed resistance, range, repetitions and complexity of movement as indicated.   (used abbreviations: BET-back education training) Date:  6/13/2018 Date:   Date:     Activity/Exercise Parameters Parameters Parameters   Upper trapezius stretch 3 x 30 sec with hands     Levator scapulae stretch 3 x 30 sec with hands                                       Manual Therapy (    Soft Tissue Mobilization Duration  Duration: 55 Minutes): improve joint and soft tissue mobility  · Supine stretching of upper trapezius and levator scapulae on the right  · Supine traction of cervical spine and suboccipitals  · Supine deep tissue mobilization of suboccipitals on the right  · Supine deep tissue mobilization of the scalp scar tissue with cream and suction cup and gliding of the scars in all directions  (Used abbreviations: MET - muscle energy technique; PNF - proprioceptive neuromuscular facilitation; NMR - neuromuscular re-education; a/p - anterior to posterior; p/a - posterior to anterior; NT - not tested)    Therapeutic Modalities: for edema and pain HEP: As above; handouts given to patient for all exercises. Treatment/Session Assessment:    · Response to Treatment:  Patient tolerated session well and reported some increased symptoms and sensitivity today. Will continue to progress as tolerated. Gave patient information on how to obtain his own suction cup. · Compliance with Program/Exercises: compliant most of the time. · Recommendations/Intent for next treatment session: \"Next visit will focus on advancements to more challenging activities\". RE-CERTIFICATION: 1 TIME A WEEK FOR 6 WEEKS FROM 7/23/2018 TO 9/17/2018 IN ORDER TO MEET ABOVE SET GOALS.     Total Treatment Duration: 65 minutes  PT Patient Time In/Time Out  Time In: 1000  Time Out: [address]

## 2018-08-29 ENCOUNTER — HOSPITAL ENCOUNTER (OUTPATIENT)
Dept: PHYSICAL THERAPY | Age: 28
Discharge: HOME | End: 2018-08-29
Payer: COMMERCIAL

## 2018-08-29 PROCEDURE — 97140 MANUAL THERAPY 1/> REGIONS: CPT

## 2018-08-29 NOTE — PROGRESS NOTES
Krysta Srivastava  : 1990  Primary: Shanti Aye Mary  Secondary:  [id number] at [address], [place], [address] [place] [address]  [phone]   [phone]         OUTPATIENT PHYSICAL THERAPY:Daily Note 2018    ICD-10: Treatment Diagnosis: laceration without foreign body of other part of head, initial encounter (S01.81XA)  Treatment Diagnosis 2: Open bite of other part of head, initial encounter (S01.85XA)  Treatment Diagnosis 3: headache (R51)  Precautions: None  Allergies:   Review of patient's allergies indicates no known allergies. Fall Risk Score: 1 (? 5 = High Risk)  MD Orders: Evaluate and Treat MEDICAL/REFERRING DIAGNOSIS:  Laceration without foreign body of other part of head, initial encounter Daniel Berrios  Open bite of other part of head, initial encounter [S01.85XA]   DATE OF ONSET: 17  REFERRING PHYSICIAN: Genevie Shone, MD  RETURN PHYSICIAN APPOINTMENT: not scheduled     INITIAL ASSESSMENT:  Mr. Brianda Barry is a 32 y.o. male presenting with scalp and head tenderness, pain, numbness, and associated nausea due to scar tissue from a dog bite he incurred 17. 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 The dog reset the original bite so he incurred 2 bites from the dog. He reported significant tearing of the skin of his scalp that required surgery and 60 staples. He currently reports significant tenderness, hypersensitivity, pain, and numbness of the areas involving the bite. He is eager to improve his symptoms and reduce overall discomfort. Patient has been seen for 12 sessions of therapy with significant success from 2018 to 2018 with significant success. He feels about 60-70% better overall with nausea, ear fullness, and overall pain in the head.   He has been performing self scar mobilizations regularly and reports improvements on his own. Will continue therapy with emphasis on scar mobilization and mobility 1 time a week until soreness of the scar improves, nausea and fullness of the ear. He is a good candidate for continued skilled intervention with services to include manual therapy, modalities as needed, therapeutic exercises, postural re-education, and scar desensitization/mobilization. PROBLEM LIST (Impacting functional limitations):  1. Increased Pain  2. Hypersensitivity, numbness, and nausea INTERVENTIONS PLANNED:  1. Cold  2. Cryotherapy  3. Heat  4. Home Exercise Program (HEP)  5. Manual Therapy  6. Range of Motion (ROM)  7. Therapeutic Exercise/Strengthening  8. scar desensitization/mobilization   TREATMENT PLAN:  Effective Dates: 7/23/2018 TO 9/17/2018. Frequency/Duration: RE-CERTIFICATION: 1 times a week for 6 weeks  GOALS: (Goals have been discussed and agreed upon with patient.)  Short-Term Functional Goals: Time Frame: 6/13/2018 to 7/4/2018  1. Patient demonstrates independence with home exercise program without verbal cueing provided by therapist. - GOAL MET  2. Improve patient's tolerance to touching his head and performing wash rag desensitization for up to 5 minutes at least 1 time a day. - GOAL MET  3. Improve flexibility of bilateral upper trapezius and levator scapulae bilaterally in order to reduce suboccipital and cervical tightness. - GOAL MET  Discharge Goals: Time Frame: 7/23/2018 to 9/17/2018  1. Improve pain 2/10 at the most with wearing a hat, touching his head, and washing his hair. - ONGOING  2. Improve tolerance to scar mobilization in the clinic and with HEP for up to 10 minutes with minimal to no sensation of nausea and minimal pain. - ONGOING  3. Improve tenderness to palpation and spasm of bilateral suboccipitals and proximal cervical paraspinals in order to reduce head pain. - ONGOING  4.  Reduce ear symptoms of fullness/opening and pain with incidence less than 1 time a day. - GOAL MET  5. Maintain Neck Disability Index score at 0/50 throughout rehab process. - GOAL MET  Rehabilitation Potential For Stated Goals: Good  Regarding Danny Singh therapy, I certify that the treatment plan above will be carried out by a therapist or under their direction. Thank you for this referral,  Vivi Manuel, PT, DPT, OCS     Referring Physician Signature: Dajuan Hu MD _________________________________ Date: ________              The information in this section was collected on 7/23/2018 (except where otherwise noted). HISTORY:   History of Present Injury/Illness (Reason for Referral):  Mr. Lady Gomez is a 32 y.o. male presenting with scalp and head tenderness, pain, numbness, and associated nausea due to scar tissue from a dog bite he incurred 6/27/17. 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 The dog reset the original bite so he incurred 2 bites from the dog. He reported significant tearing of the skin of his scalp that required surgery and 60 staples. He currently reports significant tenderness, hypersensitivity, pain, and numbness of the areas involving the bite. He is eager to improve his symptoms and reduce overall discomfort. Past Medical History/Comorbidities:   Mr. Lady Gomez  has no past medical history on file. Mr. Lady Gomez  has no past surgical history on file. Social History/Living Environment:    Patient lives at home with wife and reports no complaints with household chores/ADLs. Prior Level of Function/Work/Activity:  Independent without dysfunction. 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 He is very active and exercise at the gym lifting weights 3 times a week.   Dominant Side:         RIGHT  Current Medications:       Current Outpatient Prescriptions:    fish oil and daily multivitamin   Date Last Reviewed:  8/29/2018   Number of Personal Factors/Comorbidities that affect the Plan of Care: 0: LOW COMPLEXITY   EXAMINATION:     Patient denies any increase of symptoms with coughing, sneezing or valsalva maneuver. Patient denies any changes in vision, dizziness, vertigo, drop attacks, black outs, tinnitus, dysphagia, dysarthria, LE symptoms or bowel/bladder dysfunction. Observation/Orthostatic Postural Assessment: Patient sits with minimal (form moderate) forward head, forward shoulders, and thoracic kyphosis that is reversible with cuing. Superior portion of scalp reveals irregular scar across top of head that is completely approximated with scar tissue. No significant deformity of the skull/scalp is noted. Palpation: Gross tenderness to palpation and hypersensitivity of the scalp and scar tissue superiorly but this has improved since the start of therapy and pain/abnormal sensation is located in specific places. Significant restrictions are noted in all directions to entire scar but mostly to the right portions of the scalp with palpable and visible divot noted. Flexibility mildly limited of upper trapezius and levator scapulae bilaterally. Suboccipitals limited bilaterally. Vertebral-Basilar Screen: Hautant's test is negative. Cranial extension test is negative. ROM:   Cervical extension (degrees): 70°   Cervical flexion: 60°   Cervical left side bend: 50°   Cervical right side bend: 40°   Cervical left rotation: 80°   Cervical right rotation: 70°     Strength: Grossly 5/5 for upper quarter. Joint Mobility:  Mild to moderate limitations noted of suboccipitals with posterior glide of occiput on C1. Mild limitations with bilateral C1 on C2 rotations with cervical spine locked into full flexion.     Special Tests: Ligament stress tests performed through upper cervical spine for transverse ligament including Sharp-Pillo test is negative, and Bluefield-Axis shear test is negative. Bluefield-Axis side flexion test for integrity of alar ligament is negative. Spurling test is mildly positive on the right. Cervical distraction is for stretch and tension release of neck. Neurovascular testing for thoracic outlet syndrome is negative. Neurological Screen:  Myotomes: Key muscle strength testing for bilateral UE is WNL. Dermatomes: Sensation testing through bilateral upper quadrants for light touch is WNL. Functional Mobility:  Patient is independent and safe with all activities. Reports biggest limitation is sensitivity to the touch of the top of the head. Body Structures Involved:  1. Joints  2. Muscles  3. Ligaments Body Functions Affected:  1. Sensory/Pain  2. Neuromusculoskeletal Activities and Participation Affected:  1. Community, Social and [place]   Number of elements (examined above) that affect the Plan of Care: 3: MODERATE COMPLEXITY   CLINICAL PRESENTATION:   Presentation: Stable and uncomplicated: LOW COMPLEXITY   CLINICAL DECISION MAKING:   Outcome Measure: Tool Used: Neck Disability Index (NDI)  Score:  Initial: 0/50  Most Recent: 0-1/50 (Date: 7/23/2018)   Interpretation of Score: The Neck Disability Index is a revised form of the Oswestry Low Back Pain Index and is designed to measure the activities of daily living in person's with neck pain. Each section is scored on a 0-5 scale, 5 representing the greatest disability. The scores of each section are added together for a total score of 50. Medical Necessity:   · Patient is expected to demonstrate progress in strength, range of motion, balance and coordination to improve tolerance to touching his head, performing self scar mobilization, washing hair, and wearing hats. · Skilled intervention continues to be required due to hypersensitivity, head pain, numbness, and nausea associated with pain.   Reason for Services/Other Comments:  · Patient continues to require skilled intervention due to increasing complexity of exercises. Use of outcome tool(s) and clinical judgement create a POC that gives a: Clear prediction of patient's progress: LOW COMPLEXITY            TREATMENT:   (In addition to Assessment/Re-Assessment sessions the following treatments were rendered)    Pre-treatment Symptoms/Complaints:  Patient reports increased tenderness for a few days after last session, but feeling good today with no pain or complaints. Pain: Initial:   Pain Intensity 1: 0  Pain Location 1: Head  Post Session: 0/10     Therapeutic Exercise: ( ):  Exercises per grid below to improve mobility. Required minimal visual, verbal and manual cues to promote proper body alignment and promote proper body breathing techniques. Progressed resistance, range, repetitions and complexity of movement as indicated.   (used abbreviations: BET-back education training) Date:  6/13/2018 Date:   Date:     Activity/Exercise Parameters Parameters Parameters   Upper trapezius stretch 3 x 30 sec with hands     Levator scapulae stretch 3 x 30 sec with hands                                       Manual Therapy (    Soft Tissue Mobilization Duration  Duration: 55 Minutes): improve joint and soft tissue mobility  · Supine stretching of upper trapezius and levator scapulae on the right  · Supine traction of cervical spine and suboccipitals  · Supine deep tissue mobilization of suboccipitals on the right  · Supine deep tissue mobilization of the scalp scar tissue with cream and suction cup and gliding of the scars in all directions  (Used abbreviations: MET - muscle energy technique; PNF - proprioceptive neuromuscular facilitation; NMR - neuromuscular re-education; a/p - anterior to posterior; p/a - posterior to anterior; NT - not tested)    Therapeutic Modalities: for edema and pain HEP: As above; handouts given to patient for all exercises. Treatment/Session Assessment:    · Response to Treatment:  Patient with some tenderness and sensitivity at end of session today, but no complaints of head pain or nausea. Improved scalp mobility noted at end of treatment compared to start of session. · Compliance with Program/Exercises: compliant most of the time. · Recommendations/Intent for next treatment session: \"Next visit will focus on advancements to more challenging activities\". RE-CERTIFICATION: 1 TIME A WEEK FOR 6 WEEKS FROM 7/23/2018 TO 9/17/2018 IN ORDER TO MEET ABOVE SET GOALS.     Total Treatment Duration: 55 minutes  PT Patient Time In/Time Out  Time In: 1630  Time Out: [address] [facility], PT

## 2018-08-30 ENCOUNTER — HOSPITAL ENCOUNTER (OUTPATIENT)
Dept: PHYSICAL THERAPY | Age: 28
End: 2018-08-30
Payer: COMMERCIAL

## 2018-08-30 ENCOUNTER — APPOINTMENT (OUTPATIENT)
Dept: PHYSICAL THERAPY | Age: 28
End: 2018-08-30

## 2018-09-06 ENCOUNTER — APPOINTMENT (OUTPATIENT)
Dept: PHYSICAL THERAPY | Age: 28
End: 2018-09-06

## 2018-09-06 ENCOUNTER — HOSPITAL ENCOUNTER (OUTPATIENT)
Dept: PHYSICAL THERAPY | Age: 28
Discharge: HOME | End: 2018-09-06
Payer: COMMERCIAL

## 2018-09-06 PROCEDURE — 97140 MANUAL THERAPY 1/> REGIONS: CPT

## 2018-09-12 NOTE — PROGRESS NOTES
Jaden Espinal  : 1990  Primary: [facility]  Secondary:  [address] at [address], [place], [address] [place] [address]  [phone]   [phone]         OUTPATIENT PHYSICAL THERAPY:Daily Note 2018    ICD-10: Treatment Diagnosis: laceration without foreign body of other part of head, initial encounter (S01.81XA)  Treatment Diagnosis 2: Open bite of other part of head, initial encounter (S01.85XA)  Treatment Diagnosis 3: headache (R51)  Precautions: None  Allergies:   Review of patient's allergies indicates no known allergies. Fall Risk Score: 1 (? 5 = High Risk)  MD Orders: Evaluate and Treat MEDICAL/REFERRING DIAGNOSIS:  Laceration without foreign body of other part of head, initial encounter Daniel Tidwell  Open bite of other part of head, initial encounter [S01.85XA]   DATE OF ONSET: 17  REFERRING PHYSICIAN: Scot Tapia MD  RETURN PHYSICIAN APPOINTMENT: not scheduled     INITIAL ASSESSMENT:  Mr. Key Asencio is a 32 y.o. male presenting with scalp and head tenderness, pain, numbness, and associated nausea due to scar tissue from a dog bite he incurred 17. 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 The dog reset the original bite so he incurred 2 bites from the dog. He reported significant tearing of the skin of his scalp that required surgery and 60 staples. He currently reports significant tenderness, hypersensitivity, pain, and numbness of the areas involving the bite. He is eager to improve his symptoms and reduce overall discomfort. Patient has been seen for 12 sessions of therapy with significant success from 2018 to 2018 with significant success. He feels about 60-70% better overall with nausea, ear fullness, and overall pain in the head.   He has been performing self scar mobilizations regularly and reports improvements on his own. Will continue therapy with emphasis on scar mobilization and mobility 1 time a week until soreness of the scar improves, nausea and fullness of the ear. He is a good candidate for continued skilled intervention with services to include manual therapy, modalities as needed, therapeutic exercises, postural re-education, and scar desensitization/mobilization. PROBLEM LIST (Impacting functional limitations):  1. Increased Pain  2. Hypersensitivity, numbness, and nausea INTERVENTIONS PLANNED:  1. Cold  2. Cryotherapy  3. Heat  4. Home Exercise Program (HEP)  5. Manual Therapy  6. Range of Motion (ROM)  7. Therapeutic Exercise/Strengthening  8. scar desensitization/mobilization   TREATMENT PLAN:  Effective Dates: 7/23/2018 TO 9/17/2018. Frequency/Duration: RE-CERTIFICATION: 1 times a week for 6 weeks  GOALS: (Goals have been discussed and agreed upon with patient.)  Short-Term Functional Goals: Time Frame: 6/13/2018 to 7/4/2018  1. Patient demonstrates independence with home exercise program without verbal cueing provided by therapist. - GOAL MET  2. Improve patient's tolerance to touching his head and performing wash rag desensitization for up to 5 minutes at least 1 time a day. - GOAL MET  3. Improve flexibility of bilateral upper trapezius and levator scapulae bilaterally in order to reduce suboccipital and cervical tightness. - GOAL MET  Discharge Goals: Time Frame: 7/23/2018 to 9/17/2018  1. Improve pain 2/10 at the most with wearing a hat, touching his head, and washing his hair. - ONGOING  2. Improve tolerance to scar mobilization in the clinic and with HEP for up to 10 minutes with minimal to no sensation of nausea and minimal pain. - ONGOING  3. Improve tenderness to palpation and spasm of bilateral suboccipitals and proximal cervical paraspinals in order to reduce head pain. - ONGOING  4.  Reduce ear symptoms of fullness/opening and pain with incidence less than 1 time a day. - GOAL MET  5. Maintain Neck Disability Index score at 0/50 throughout rehab process. - GOAL MET  Rehabilitation Potential For Stated Goals: Good  Regarding Ruth Ferreira therapy, I certify that the treatment plan above will be carried out by a therapist or under their direction. Thank you for this referral,  Christine Marcos, PT, DPT, OCS     Referring Physician Signature: Yadi Correa MD _________________________________ Date: ________              The information in this section was collected on 7/23/2018 (except where otherwise noted). HISTORY:   History of Present Injury/Illness (Reason for Referral):  Mr. Shilo Back is a 32 y.o. male presenting with scalp and head tenderness, pain, numbness, and associated nausea due to scar tissue from a dog bite he incurred 6/27/17. 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 The dog reset the original bite so he incurred 2 bites from the dog. He reported significant tearing of the skin of his scalp that required surgery and 60 staples. He currently reports significant tenderness, hypersensitivity, pain, and numbness of the areas involving the bite. He is eager to improve his symptoms and reduce overall discomfort. Past Medical History/Comorbidities:   Mr. Shilo Back  has no past medical history on file. Mr. Shilo Back  has no past surgical history on file. Social History/Living Environment:    Patient lives at home with wife and reports no complaints with household chores/ADLs. Prior Level of Function/Work/Activity:  Independent without dysfunction. 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 He is very active and exercise at the gym lifting weights 3 times a week.   Dominant Side:         RIGHT  Current Medications:       Current Outpatient Prescriptions:    fish oil and daily multivitamin   Date Last Reviewed:  9/12/2018   Number of Personal Factors/Comorbidities that affect the Plan of Care: 0: LOW COMPLEXITY   EXAMINATION:     Patient denies any increase of symptoms with coughing, sneezing or valsalva maneuver. Patient denies any changes in vision, dizziness, vertigo, drop attacks, black outs, tinnitus, dysphagia, dysarthria, LE symptoms or bowel/bladder dysfunction. Observation/Orthostatic Postural Assessment: Patient sits with minimal (form moderate) forward head, forward shoulders, and thoracic kyphosis that is reversible with cuing. Superior portion of scalp reveals irregular scar across top of head that is completely approximated with scar tissue. No significant deformity of the skull/scalp is noted. Palpation: Gross tenderness to palpation and hypersensitivity of the scalp and scar tissue superiorly but this has improved since the start of therapy and pain/abnormal sensation is located in specific places. Significant restrictions are noted in all directions to entire scar but mostly to the right portions of the scalp with palpable and visible divot noted. Flexibility mildly limited of upper trapezius and levator scapulae bilaterally. Suboccipitals limited bilaterally. Vertebral-Basilar Screen: Hautant's test is negative. Cranial extension test is negative. ROM:   Cervical extension (degrees): 70°   Cervical flexion: 60°   Cervical left side bend: 50°   Cervical right side bend: 40°   Cervical left rotation: 80°   Cervical right rotation: 70°     Strength: Grossly 5/5 for upper quarter. Joint Mobility:  Mild to moderate limitations noted of suboccipitals with posterior glide of occiput on C1. Mild limitations with bilateral C1 on C2 rotations with cervical spine locked into full flexion.     Special Tests: Ligament stress tests performed through upper cervical spine for transverse ligament including Sharp-Pillo test is negative, and Parmele-Axis shear test is negative. Parmele-Axis side flexion test for integrity of alar ligament is negative. Spurling test is mildly positive on the right. Cervical distraction is for stretch and tension release of neck. Neurovascular testing for thoracic outlet syndrome is negative. Neurological Screen:  Myotomes: Key muscle strength testing for bilateral UE is WNL. Dermatomes: Sensation testing through bilateral upper quadrants for light touch is WNL. Functional Mobility:  Patient is independent and safe with all activities. Reports biggest limitation is sensitivity to the touch of the top of the head. Body Structures Involved:  1. Joints  2. Muscles  3. Ligaments Body Functions Affected:  1. Sensory/Pain  2. Neuromusculoskeletal Activities and Participation Affected:  1. Community, Social and [facility]   Number of elements (examined above) that affect the Plan of Care: 3: MODERATE COMPLEXITY   CLINICAL PRESENTATION:   Presentation: Stable and uncomplicated: LOW COMPLEXITY   CLINICAL DECISION MAKING:   Outcome Measure: Tool Used: Neck Disability Index (NDI)  Score:  Initial: 0/50  Most Recent: 0-1/50 (Date: 7/23/2018)   Interpretation of Score: The Neck Disability Index is a revised form of the Oswestry Low Back Pain Index and is designed to measure the activities of daily living in person's with neck pain. Each section is scored on a 0-5 scale, 5 representing the greatest disability. The scores of each section are added together for a total score of 50. Medical Necessity:   · Patient is expected to demonstrate progress in strength, range of motion, balance and coordination to improve tolerance to touching his head, performing self scar mobilization, washing hair, and wearing hats. · Skilled intervention continues to be required due to hypersensitivity, head pain, numbness, and nausea associated with pain.   Reason for Services/Other Comments:  · Patient continues to require skilled intervention due to increasing complexity of exercises. Use of outcome tool(s) and clinical judgement create a POC that gives a: Clear prediction of patient's progress: LOW COMPLEXITY            TREATMENT:   (In addition to Assessment/Re-Assessment sessions the following treatments were rendered)    Pre-treatment Symptoms/Complaints:  Patient reported continued pain in the top of the head. Pain: Initial:      Post Session: 0/10     Therapeutic Exercise: ( ):  Exercises per grid below to improve mobility. Required minimal visual, verbal and manual cues to promote proper body alignment and promote proper body breathing techniques. Progressed resistance, range, repetitions and complexity of movement as indicated. (used abbreviations: BET-back education training) Date:  6/13/2018 Date:   Date:     Activity/Exercise Parameters Parameters Parameters   Upper trapezius stretch 3 x 30 sec with hands     Levator scapulae stretch 3 x 30 sec with hands                                       Manual Therapy (      55 minutes): improve joint and soft tissue mobility  · Supine stretching of upper trapezius and levator scapulae on the right  · Supine traction of cervical spine and suboccipitals  · Supine deep tissue mobilization of suboccipitals on the right  · Supine deep tissue mobilization of the scalp scar tissue with cream and suction cup and gliding of the scars in all directions  (Used abbreviations: MET - muscle energy technique; PNF - proprioceptive neuromuscular facilitation; NMR - neuromuscular re-education; a/p - anterior to posterior; p/a - posterior to anterior; NT - not tested)    Therapeutic Modalities: for edema and pain                                                                                               HEP: As above; handouts given to patient for all exercises.       Treatment/Session Assessment:    · Response to Treatment:  Patient reported less pain at the end of session. Continues to tolerated sessions well. Patient declined ice. · Compliance with Program/Exercises: compliant most of the time. · Recommendations/Intent for next treatment session: \"Next visit will focus on advancements to more challenging activities\". RE-CERTIFICATION: 1 TIME A WEEK FOR 6 WEEKS FROM 7/23/2018 TO 9/17/2018 IN ORDER TO MEET ABOVE SET GOALS.     Total Treatment Duration: 55 minutes ; time in 15:30, time out 16:25       Yahir Melara, PT

## 2018-09-13 ENCOUNTER — APPOINTMENT (OUTPATIENT)
Dept: PHYSICAL THERAPY | Age: 28
End: 2018-09-13

## 2018-09-13 ENCOUNTER — APPOINTMENT (OUTPATIENT)
Dept: PHYSICAL THERAPY | Age: 28
End: 2018-09-13
Payer: COMMERCIAL

## 2018-09-14 ENCOUNTER — HOSPITAL ENCOUNTER (OUTPATIENT)
Dept: PHYSICAL THERAPY | Age: 28
Discharge: HOME | End: 2018-09-14
Payer: COMMERCIAL

## 2018-09-14 PROCEDURE — 97140 MANUAL THERAPY 1/> REGIONS: CPT

## 2018-09-14 NOTE — PROGRESS NOTES
Johan Rodgers  : 1990  Primary: Delor[facility]  Secondary:  [address] at [address], [place], [address] [place] [address]  [phone]   [phone]         OUTPATIENT PHYSICAL THERAPY:Daily Note 2018    ICD-10: Treatment Diagnosis: laceration without foreign body of other part of head, initial encounter (S01.81XA)  Treatment Diagnosis 2: Open bite of other part of head, initial encounter (S01.85XA)  Treatment Diagnosis 3: headache (R51)  Precautions: None  Allergies:   Review of patient's allergies indicates no known allergies. Fall Risk Score: 1 (? 5 = High Risk)  MD Orders: Evaluate and Treat MEDICAL/REFERRING DIAGNOSIS:  Laceration without foreign body of other part of head, initial encounter Leila Ground  Open bite of other part of head, initial encounter [S01.85XA]   DATE OF ONSET: 17  REFERRING PHYSICIAN: Maci Chicas MD  RETURN PHYSICIAN APPOINTMENT: not scheduled     INITIAL ASSESSMENT:  Mr. Nic Patton is a 32 y.o. male presenting with scalp and head tenderness, pain, numbness, and associated nausea due to scar tissue from a dog bite he incurred 17. 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 The dog reset the original bite so he incurred 2 bites from the dog. He reported significant tearing of the skin of his scalp that required surgery and 60 staples. He currently reports significant tenderness, hypersensitivity, pain, and numbness of the areas involving the bite. He is eager to improve his symptoms and reduce overall discomfort. Patient has been seen for 12 sessions of therapy with significant success from 2018 to 2018 with significant success. He feels about 60-70% better overall with nausea, ear fullness, and overall pain in the head.   He has been performing self scar mobilizations regularly and reports improvements on his own. Will continue therapy with emphasis on scar mobilization and mobility 1 time a week until soreness of the scar improves, nausea and fullness of the ear. He is a good candidate for continued skilled intervention with services to include manual therapy, modalities as needed, therapeutic exercises, postural re-education, and scar desensitization/mobilization. PROBLEM LIST (Impacting functional limitations):  1. Increased Pain  2. Hypersensitivity, numbness, and nausea INTERVENTIONS PLANNED:  1. Cold  2. Cryotherapy  3. Heat  4. Home Exercise Program (HEP)  5. Manual Therapy  6. Range of Motion (ROM)  7. Therapeutic Exercise/Strengthening  8. scar desensitization/mobilization   TREATMENT PLAN:  Effective Dates: 7/23/2018 TO 9/17/2018. Frequency/Duration: RE-CERTIFICATION: 1 times a week for 6 weeks  GOALS: (Goals have been discussed and agreed upon with patient.)  Short-Term Functional Goals: Time Frame: 6/13/2018 to 7/4/2018  1. Patient demonstrates independence with home exercise program without verbal cueing provided by therapist. - GOAL MET  2. Improve patient's tolerance to touching his head and performing wash rag desensitization for up to 5 minutes at least 1 time a day. - GOAL MET  3. Improve flexibility of bilateral upper trapezius and levator scapulae bilaterally in order to reduce suboccipital and cervical tightness. - GOAL MET  Discharge Goals: Time Frame: 7/23/2018 to 9/17/2018  1. Improve pain 2/10 at the most with wearing a hat, touching his head, and washing his hair. - ONGOING  2. Improve tolerance to scar mobilization in the clinic and with HEP for up to 10 minutes with minimal to no sensation of nausea and minimal pain. - ONGOING  3. Improve tenderness to palpation and spasm of bilateral suboccipitals and proximal cervical paraspinals in order to reduce head pain. - ONGOING  4.  Reduce ear symptoms of fullness/opening and pain with incidence less than 1 time a day. - GOAL MET  5. Maintain Neck Disability Index score at 0/50 throughout rehab process. - GOAL MET  Rehabilitation Potential For Stated Goals: Good  Regarding Jewel Luke therapy, I certify that the treatment plan above will be carried out by a therapist or under their direction. Thank you for this referral,  Mariza Keyes, PT, DPT, OCS     Referring Physician Signature: Jack Kendrick MD _________________________________ Date: ________              The information in this section was collected on 7/23/2018 (except where otherwise noted). HISTORY:   History of Present Injury/Illness (Reason for Referral):  Mr. Анна Villatoro is a 32 y.o. male presenting with scalp and head tenderness, pain, numbness, and associated nausea due to scar tissue from a dog bite he incurred 6/27/17. He reported he was training a [employer] [address] [employer] as a part of his job as a [employer]  and the dog accidentally bit him on the top of the head. The dog reset the original bite so he incurred 2 bites from the dog. He reported significant tearing of the skin of his scalp that required surgery and 60 staples. He currently reports significant tenderness, hypersensitivity, pain, and numbness of the areas involving the bite. He is eager to improve his symptoms and reduce overall discomfort. Past Medical History/Comorbidities:   Mr. Анна Villatoro  has no past medical history on file. Mr. Анна Villatoro  has no past surgical history on file. Social History/Living Environment:    Patient lives at home with wife and reports no complaints with household chores/ADLs. Prior Level of Function/Work/Activity:  Independent without dysfunction. 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 He is very active and exercise at the gym lifting weights 3 times a week.   Dominant Side:         RIGHT  Current Medications:       Current Outpatient Prescriptions:    fish oil and daily multivitamin   Date Last Reviewed:  9/14/2018   Number of Personal Factors/Comorbidities that affect the Plan of Care: 0: LOW COMPLEXITY   EXAMINATION:     Patient denies any increase of symptoms with coughing, sneezing or valsalva maneuver. Patient denies any changes in vision, dizziness, vertigo, drop attacks, black outs, tinnitus, dysphagia, dysarthria, LE symptoms or bowel/bladder dysfunction. Observation/Orthostatic Postural Assessment: Patient sits with minimal (form moderate) forward head, forward shoulders, and thoracic kyphosis that is reversible with cuing. Superior portion of scalp reveals irregular scar across top of head that is completely approximated with scar tissue. No significant deformity of the skull/scalp is noted. Palpation: Gross tenderness to palpation and hypersensitivity of the scalp and scar tissue superiorly but this has improved since the start of therapy and pain/abnormal sensation is located in specific places. Significant restrictions are noted in all directions to entire scar but mostly to the right portions of the scalp with palpable and visible divot noted. Flexibility mildly limited of upper trapezius and levator scapulae bilaterally. Suboccipitals limited bilaterally. Vertebral-Basilar Screen: Hautant's test is negative. Cranial extension test is negative. ROM:   Cervical extension (degrees): 70°   Cervical flexion: 60°   Cervical left side bend: 50°   Cervical right side bend: 40°   Cervical left rotation: 80°   Cervical right rotation: 70°     Strength: Grossly 5/5 for upper quarter. Joint Mobility:  Mild to moderate limitations noted of suboccipitals with posterior glide of occiput on C1. Mild limitations with bilateral C1 on C2 rotations with cervical spine locked into full flexion.     Special Tests: Ligament stress tests performed through upper cervical spine for transverse ligament including Sharp-Pillo test is negative, and Oceana-Axis shear test is negative. Oceana-Axis side flexion test for integrity of alar ligament is negative. Spurling test is mildly positive on the right. Cervical distraction is for stretch and tension release of neck. Neurovascular testing for thoracic outlet syndrome is negative. Neurological Screen:  Myotomes: Key muscle strength testing for bilateral UE is WNL. Dermatomes: Sensation testing through bilateral upper quadrants for light touch is WNL. Functional Mobility:  Patient is independent and safe with all activities. Reports biggest limitation is sensitivity to the touch of the top of the head. Body Structures Involved:  1. Joints  2. Muscles  3. Ligaments Body Functions Affected:  1. Sensory/Pain  2. Neuromusculoskeletal Activities and Participation Affected:  1. Community, Social and [place]   Number of elements (examined above) that affect the Plan of Care: 3: MODERATE COMPLEXITY   CLINICAL PRESENTATION:   Presentation: Stable and uncomplicated: LOW COMPLEXITY   CLINICAL DECISION MAKING:   Outcome Measure: Tool Used: Neck Disability Index (NDI)  Score:  Initial: 0/50  Most Recent: 0-1/50 (Date: 7/23/2018)   Interpretation of Score: The Neck Disability Index is a revised form of the Oswestry Low Back Pain Index and is designed to measure the activities of daily living in person's with neck pain. Each section is scored on a 0-5 scale, 5 representing the greatest disability. The scores of each section are added together for a total score of 50. Medical Necessity:   · Patient is expected to demonstrate progress in strength, range of motion, balance and coordination to improve tolerance to touching his head, performing self scar mobilization, washing hair, and wearing hats. · Skilled intervention continues to be required due to hypersensitivity, head pain, numbness, and nausea associated with pain.   Reason for Services/Other Comments:  · Patient continues to require skilled intervention due to increasing complexity of exercises. Use of outcome tool(s) and clinical judgement create a POC that gives a: Clear prediction of patient's progress: LOW COMPLEXITY            TREATMENT:   (In addition to Assessment/Re-Assessment sessions the following treatments were rendered)    Pre-treatment Symptoms/Complaints:  Patient reported continued pain in the top of the head and intermittent ear and through pain/sensations. Advised patient to buy small suction cup. Pain: Initial:   Pain Intensity 1: 0  Pain Location 1: Head  Post Session: 0/10     Therapeutic Exercise: ( ):  Exercises per grid below to improve mobility. Required minimal visual, verbal and manual cues to promote proper body alignment and promote proper body breathing techniques. Progressed resistance, range, repetitions and complexity of movement as indicated.   (used abbreviations: BET-back education training) Date:  6/13/2018 Date:   Date:     Activity/Exercise Parameters Parameters Parameters   Upper trapezius stretch 3 x 30 sec with hands     Levator scapulae stretch 3 x 30 sec with hands                                       Manual Therapy (    Soft Tissue Mobilization Duration  Duration: 55 Minutes 55 minutes): improve joint and soft tissue mobility  · Supine stretching of upper trapezius and levator scapulae on the right  · Supine traction of cervical spine and suboccipitals  · Supine deep tissue mobilization of suboccipitals on the right  · Supine deep tissue mobilization of the scalp scar tissue with cream and suction cup and gliding of the scars in all directions  (Used abbreviations: MET - muscle energy technique; PNF - proprioceptive neuromuscular facilitation; NMR - neuromuscular re-education; a/p - anterior to posterior; p/a - posterior to anterior; NT - not tested)    Therapeutic Modalities: for edema and pain                              Cervical Spine Cold  Type: Cold/ice pack  Duration : 10 minutes  Patient Position: Supine                                                                HEP: As above; handouts given to patient for all exercises. Treatment/Session Assessment:    · Response to Treatment:  Patient reported sharp pain of the left portion of scar and increased pain on the right portion of the scar today with mobilization. · Compliance with Program/Exercises: compliant most of the time. · Recommendations/Intent for next treatment session: \"Next visit will focus on advancements to more challenging activities\". RE-CERTIFICATION: 1 TIME A WEEK FOR 6 WEEKS FROM 7/23/2018 TO 9/17/2018 IN ORDER TO MEET ABOVE SET GOALS.     Total Treatment Duration: 55 minutes   PT Patient Time In/Time Out  Time In: 1200  Time Out: [address]

## 2018-09-20 ENCOUNTER — APPOINTMENT (OUTPATIENT)
Dept: PHYSICAL THERAPY | Age: 28
End: 2018-09-20

## 2018-09-20 ENCOUNTER — HOSPITAL ENCOUNTER (OUTPATIENT)
Dept: PHYSICAL THERAPY | Age: 28
Discharge: HOME | End: 2018-09-20
Payer: COMMERCIAL

## 2018-09-20 PROCEDURE — 97140 MANUAL THERAPY 1/> REGIONS: CPT

## 2018-09-20 NOTE — THERAPY DISCHARGE
Sawyer Bain  : 1990  Primary: Davion [facility]  Secondary:  [address] at [place] [address], Nelson kaba, [address]  [phone]   [phone]         OUTPATIENT PHYSICAL THERAPY:Daily Note and Discharge 2018    ICD-10: Treatment Diagnosis: laceration without foreign body of other part of head, initial encounter (S01.81XA)  Treatment Diagnosis 2: Open bite of other part of head, initial encounter (S01.85XA)  Treatment Diagnosis 3: headache (R51)  Precautions: None  Allergies:   Review of patient's allergies indicates no known allergies. Fall Risk Score: 1 (? 5 = High Risk)  MD Orders: Evaluate and Treat MEDICAL/REFERRING DIAGNOSIS:  Laceration without foreign body of other part of head, initial encounter Martin Kumar  Open bite of other part of head, initial encounter [S01.85XA]   DATE OF ONSET: 17  REFERRING PHYSICIAN: Nik Gutierrez MD  RETURN PHYSICIAN APPOINTMENT: not scheduled     INITIAL ASSESSMENT:  Mr. Jaja Eaton is a 32 y.o. male presenting with scalp and head tenderness, pain, numbness, and associated nausea due to scar tissue from a dog bite he incurred 17. 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 The dog reset the original bite so he incurred 2 bites from the dog. He reported significant tearing of the skin of his scalp that required surgery and 60 staples. He currently reports significant tenderness, hypersensitivity, pain, and numbness of the areas involving the bite. He is eager to improve his symptoms and reduce overall discomfort. Patient has been seen for 18 sessions of therapy with significant success from 2018 to 2018 with significant success. He feels about 60-70% better overall with nausea, ear fullness, and overall pain in the head.   He has reported a recent plateau of his symptoms and was advised to try working on his scar on his own and to also follow-up with plastic surgeon for further ideas regarding prognosis of his head symptoms. He has met most preset goals and is discharged to [facility] consisting of self mobilization of his scar with his own suction cup and massage cream.  He is discharged at this time. PROBLEM LIST (Impacting functional limitations):  1. Increased Pain  2. Hypersensitivity, numbness, and nausea INTERVENTIONS PLANNED:  1. Cold  2. Cryotherapy  3. Heat  4. Home Exercise Program (HEP)  5. Manual Therapy  6. Range of Motion (ROM)  7. Therapeutic Exercise/Strengthening  8. scar desensitization/mobilization   TREATMENT PLAN:  Effective Dates: 7/23/2018 TO 9/17/2018. Frequency/Duration: Patient has been seen for 18 sessions of therapy with significant success from 6/13/2018 to 9/20/2018 with significant success. He feels about 60-70% better overall with nausea, ear fullness, and overall pain in the head. He has reported a recent plateau of his symptoms and was advised to try working on his scar on his own and to also follow-up with plastic surgeon for further ideas regarding prognosis of his head symptoms. He has met most preset goals and is discharged to HEP consisting of self mobilization of his scar with his own suction cup and massage cream.  He is discharged at this time. GOALS: (Goals have been discussed and agreed upon with patient.)  Short-Term Functional Goals: Time Frame: 6/13/2018 to 7/4/2018  1. Patient demonstrates independence with home exercise program without verbal cueing provided by therapist. - GOAL MET  2. Improve patient's tolerance to touching his head and performing wash rag desensitization for up to 5 minutes at least 1 time a day. - GOAL MET  3.  Improve flexibility of bilateral upper trapezius and levator scapulae bilaterally in order to reduce suboccipital and cervical tightness. - GOAL MET  Discharge Goals: Time Frame: 7/23/2018 to 9/17/2018  1. Improve pain 2/10 at the most with wearing a hat, touching his head, and washing his hair. - GOAL MET   2. Improve tolerance to scar mobilization in the clinic and with HEP for up to 10 minutes with minimal to no sensation of nausea and minimal pain. - GOAL MET  3. Improve tenderness to palpation and spasm of bilateral suboccipitals and proximal cervical paraspinals in order to reduce head pain. - GOAL MET  4. Reduce ear symptoms of fullness/opening and pain with incidence less than 1 time a day. - GOAL MET  5. Maintain Neck Disability Index score at 0/50 throughout rehab process. - GOAL MET  Rehabilitation Potential For Stated Goals: Good  Regarding Claryce Sanford therapy, I certify that the treatment plan above will be carried out by a therapist or under their direction. Thank you for this referral,  Mychal Leong, PT, DPT, OCS     Referring Physician Signature: Luis Carlos Clark MD _________________________________ Date: ________              The information in this section was collected on 9/20/2018 (except where otherwise noted). HISTORY:   History of Present Injury/Illness (Reason for Referral):  Mr. Napoleon Mallory is a 32 y.o. male presenting with scalp and head tenderness, pain, numbness, and associated nausea due to scar tissue from a dog bite he incurred 6/27/17. 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 The dog reset the original bite so he incurred 2 bites from the dog. He reported significant tearing of the skin of his scalp that required surgery and 60 staples. He currently reports significant tenderness, hypersensitivity, pain, and numbness of the areas involving the bite. He is eager to improve his symptoms and reduce overall discomfort.     Past Medical History/Comorbidities:   Mr. Анна Villatoro  has no past medical history on file. Mr. Анна Villatoro  has no past surgical history on file. Social History/Living Environment:    Patient lives at home with wife and reports no complaints with household chores/ADLs. Prior Level of Function/Work/Activity:  Independent without dysfunction. 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 He is very active and exercise at the gym lifting weights 3 times a week. Dominant Side:         RIGHT  Current Medications:       Current Outpatient Prescriptions:    fish oil and daily multivitamin   Date Last Reviewed:  9/20/2018   Number of Personal Factors/Comorbidities that affect the Plan of Care: 0: LOW COMPLEXITY   EXAMINATION:     Patient denies any increase of symptoms with coughing, sneezing or valsalva maneuver. Patient denies any changes in vision, dizziness, vertigo, drop attacks, black outs, tinnitus, dysphagia, dysarthria, LE symptoms or bowel/bladder dysfunction. Observation/Orthostatic Postural Assessment: Patient sits with minimal (form moderate) forward head, forward shoulders, and thoracic kyphosis that is reversible with cuing. Superior portion of scalp reveals irregular scar across top of head that is completely approximated with scar tissue. No significant deformity of the skull/scalp is noted. Palpation: Gross tenderness to palpation and hypersensitivity of the scalp and scar tissue superiorly but this has improved since the start of therapy and pain/abnormal sensation is located in specific places. Significant restrictions are noted in all directions to entire scar but mostly to the right portions of the scalp with palpable and visible divot noted in the skin. Flexibility mildly limited of upper trapezius and levator scapulae bilaterally. Suboccipitals limited bilaterally. Vertebral-Basilar Screen: Hautant's test is negative. Cranial extension test is negative.     ROM:   Cervical extension (degrees): 70°   Cervical flexion: 60°   Cervical left side bend: 50°   Cervical right side bend: 40°   Cervical left rotation: 80°   Cervical right rotation: 70°     Strength: Grossly 5/5 for upper quarter. Joint Mobility:  Mild to moderate limitations noted of suboccipitals with posterior glide of occiput on C1. Mild limitations with bilateral C1 on C2 rotations with cervical spine locked into full flexion. Special Tests: Ligament stress tests performed through upper cervical spine for transverse ligament including Sharp-Pillo test is negative, and [place]-Axis shear test is negative. [place]-Axis side flexion test for integrity of alar ligament is negative. Spurling test is mildly positive on the right. Cervical distraction is for stretch and tension release of neck. Neurovascular testing for thoracic outlet syndrome is negative. Neurological Screen:  Myotomes: Key muscle strength testing for bilateral UE is WNL. Dermatomes: Sensation testing through bilateral upper quadrants for light touch is WNL. Functional Mobility:  Patient is independent and safe with all activities. Reports biggest limitation is sensitivity to the touch of the top of the head. Body Structures Involved:  1. Joints  2. Muscles  3. Ligaments Body Functions Affected:  1. Sensory/Pain  2. Neuromusculoskeletal Activities and Participation Affected:  1. Community, Social and [place] Reading   Number of elements (examined above) that affect the Plan of Care: 3: MODERATE COMPLEXITY   CLINICAL PRESENTATION:   Presentation: Stable and uncomplicated: LOW COMPLEXITY   CLINICAL DECISION MAKING:   Outcome Measure: Tool Used: Neck Disability Index (NDI)  Score:  Initial: 0/50  Most Recent: 0-1/50 (Date: 7/23/2018)                       3/50 (Date: 9/20/2018)   Interpretation of Score:  The Neck Disability Index is a revised form of the Oswestry Low Back Pain Index and is designed to measure the activities of daily living in person's with neck pain. Each section is scored on a 0-5 scale, 5 representing the greatest disability. The scores of each section are added together for a total score of 50. Medical Necessity:   Patient has been seen for 18 sessions of therapy with significant success from 6/13/2018 to 9/20/2018 with significant success. He feels about 60-70% better overall with nausea, ear fullness, and overall pain in the head. He has reported a recent plateau of his symptoms and was advised to try working on his scar on his own and to also follow-up with plastic surgeon for further ideas regarding prognosis of his head symptoms. He has met most preset goals and is discharged to [facility] consisting of self mobilization of his scar with his own suction cup and massage cream.  He is discharged at this time. Use of outcome tool(s) and clinical judgement create a POC that gives a: Clear prediction of patient's progress: LOW COMPLEXITY            TREATMENT:   (In addition to Assessment/Re-Assessment sessions the following treatments were rendered)    Pre-treatment Symptoms/Complaints:  Patient reported he bought his suction cup and plans to use it at home. He was also advised to work his incision when he grows out his hair by pulling the hair and moving the skin all directions. Pain: Initial:   Pain Intensity 1: 0  Pain Location 1: Head  Post Session: 0/10     Therapeutic Exercise: ( ):  Exercises per grid below to improve mobility. Required minimal visual, verbal and manual cues to promote proper body alignment and promote proper body breathing techniques. Progressed resistance, range, repetitions and complexity of movement as indicated.   (used abbreviations: BET-back education training) Date:  6/13/2018 Date:   Date:     Activity/Exercise Parameters Parameters Parameters   Upper trapezius stretch 3 x 30 sec with hands     Levator scapulae stretch 3 x 30 sec with hands                                       Manual Therapy ( Soft Tissue Mobilization Duration  Duration: 55 Minutes ): improve joint and soft tissue mobility  · Supine stretching of upper trapezius and levator scapulae on the right  · Supine traction of cervical spine and suboccipitals  · Supine deep tissue mobilization of suboccipitals on the right  · Supine deep tissue mobilization of the scalp scar tissue with cream and suction cup and gliding of the scars in all directions  (Used abbreviations: MET - muscle energy technique; PNF - proprioceptive neuromuscular facilitation; NMR - neuromuscular re-education; a/p - anterior to posterior; p/a - posterior to anterior; NT - not tested)    Therapeutic Modalities: for edema and pain                              Cervical Spine Cold  Type: Cold/ice pack  Duration : 10 minutes  Patient Position: Supine                                                                HEP: As above; handouts given to patient for all exercises. Treatment/Session Assessment:    · Response to Treatment:  Patient reported he feels as thought he has plateaued in his progress. He is discharged at this time and was advised to return to therapy in the future if he feels it would help his progress. He was also advised to follow up with plastic surgeon in order to gait better understanding of his prognosis. · Compliance with Program/Exercises: compliant most of the time. · Recommendations/Intent for next treatment session:  Patient is discharged a this time.     Total Treatment Duration: 65 minutes   PT Patient Time In/Time Out  Time In: 1530  Time Out: [place], [place]